# Patient Record
Sex: FEMALE | Race: BLACK OR AFRICAN AMERICAN | NOT HISPANIC OR LATINO | Employment: OTHER | URBAN - METROPOLITAN AREA
[De-identification: names, ages, dates, MRNs, and addresses within clinical notes are randomized per-mention and may not be internally consistent; named-entity substitution may affect disease eponyms.]

---

## 2023-08-29 ENCOUNTER — OFFICE VISIT (OUTPATIENT)
Dept: PRIMARY CARE | Facility: CLINIC | Age: 83
End: 2023-08-29
Payer: COMMERCIAL

## 2023-08-29 VITALS
WEIGHT: 162 LBS | HEIGHT: 67 IN | HEART RATE: 111 BPM | BODY MASS INDEX: 25.43 KG/M2 | SYSTOLIC BLOOD PRESSURE: 139 MMHG | TEMPERATURE: 96 F | DIASTOLIC BLOOD PRESSURE: 70 MMHG

## 2023-08-29 DIAGNOSIS — M25.561 CHRONIC PAIN OF RIGHT KNEE: ICD-10-CM

## 2023-08-29 DIAGNOSIS — G89.29 CHRONIC PAIN OF RIGHT KNEE: ICD-10-CM

## 2023-08-29 DIAGNOSIS — E78.00 HYPERCHOLESTEROLEMIA: ICD-10-CM

## 2023-08-29 DIAGNOSIS — E55.9 VITAMIN D DEFICIENCY: ICD-10-CM

## 2023-08-29 DIAGNOSIS — H40.9 GLAUCOMA, UNSPECIFIED GLAUCOMA TYPE, UNSPECIFIED LATERALITY: ICD-10-CM

## 2023-08-29 DIAGNOSIS — I10 BENIGN ESSENTIAL HTN: Chronic | ICD-10-CM

## 2023-08-29 DIAGNOSIS — Z23 NEED FOR PNEUMOCOCCAL VACCINE: ICD-10-CM

## 2023-08-29 DIAGNOSIS — Z00.00 ANNUAL PHYSICAL EXAM: Primary | ICD-10-CM

## 2023-08-29 PROBLEM — R73.03 PREDIABETES: Status: ACTIVE | Noted: 2018-07-24

## 2023-08-29 PROCEDURE — 1036F TOBACCO NON-USER: CPT | Performed by: INTERNAL MEDICINE

## 2023-08-29 PROCEDURE — G0439 PPPS, SUBSEQ VISIT: HCPCS | Performed by: INTERNAL MEDICINE

## 2023-08-29 PROCEDURE — 3075F SYST BP GE 130 - 139MM HG: CPT | Performed by: INTERNAL MEDICINE

## 2023-08-29 PROCEDURE — 3078F DIAST BP <80 MM HG: CPT | Performed by: INTERNAL MEDICINE

## 2023-08-29 PROCEDURE — 1159F MED LIST DOCD IN RCRD: CPT | Performed by: INTERNAL MEDICINE

## 2023-08-29 PROCEDURE — 1170F FXNL STATUS ASSESSED: CPT | Performed by: INTERNAL MEDICINE

## 2023-08-29 PROCEDURE — 1160F RVW MEDS BY RX/DR IN RCRD: CPT | Performed by: INTERNAL MEDICINE

## 2023-08-29 RX ORDER — DILTIAZEM HYDROCHLORIDE 120 MG/1
120 CAPSULE, EXTENDED RELEASE ORAL DAILY
Qty: 90 CAPSULE | Refills: 3 | Status: SHIPPED | OUTPATIENT
Start: 2023-08-29 | End: 2024-08-28

## 2023-08-29 RX ORDER — DILTIAZEM HYDROCHLORIDE 120 MG/1
120 CAPSULE, EXTENDED RELEASE ORAL DAILY
COMMUNITY
Start: 2023-06-15 | End: 2023-08-29 | Stop reason: SDUPTHER

## 2023-08-29 RX ORDER — EZETIMIBE 10 MG/1
10 TABLET ORAL DAILY
Qty: 90 TABLET | Refills: 3 | Status: SHIPPED | OUTPATIENT
Start: 2023-08-29 | End: 2024-08-28

## 2023-08-29 ASSESSMENT — ENCOUNTER SYMPTOMS
OCCASIONAL FEELINGS OF UNSTEADINESS: 0
FEVER: 0
DEPRESSION: 0
POLYDIPSIA: 0
LOSS OF SENSATION IN FEET: 0
SHORTNESS OF BREATH: 0
CHILLS: 0
PALPITATIONS: 0
COUGH: 0

## 2023-08-29 ASSESSMENT — PATIENT HEALTH QUESTIONNAIRE - PHQ9
SUM OF ALL RESPONSES TO PHQ9 QUESTIONS 1 AND 2: 0
1. LITTLE INTEREST OR PLEASURE IN DOING THINGS: NOT AT ALL
2. FEELING DOWN, DEPRESSED OR HOPELESS: NOT AT ALL

## 2023-08-29 ASSESSMENT — ACTIVITIES OF DAILY LIVING (ADL)
DRESSING: INDEPENDENT
TAKING_MEDICATION: INDEPENDENT
DOING_HOUSEWORK: INDEPENDENT
GROCERY_SHOPPING: INDEPENDENT
MANAGING_FINANCES: INDEPENDENT
BATHING: INDEPENDENT

## 2023-08-29 NOTE — PROGRESS NOTES
"Subjective   Patient ID: Wilma Lazcano is a 83 y.o. female who presents for No chief complaint on file..    Patient presents today for an annual wellness exam    Medical history and surgical history updated today  Medication list reconciled and updated  Patient denies vision issues at this time  Patient denies hearing issues at this time  Follows with a dentist: Yes    Patient counseled about available preventative health vaccinations and provided with opportunity to update them with our office or through prescription to preferred pharmacy.    Reviewed and discussed preventative health screening recommendations for colon cancer: age out    Reviewed and discussed preventative health recommendations for screening for cervical cancer and breast cancer : age out    Eye specialist follow up scheduled for Oct 29th.     Exercise habits are severely limited by the patient's chronic right knee issues.  Pain and aching limited mobility limited range of motion limited ambulation secondary to knee issues.  No prior imaging or assessment on file.  This is reportedly a chronic issue and does not report a acute change.  She been decreasing her exercise capacity over the last 2 years roughly.  Previously was walking on local school track every morning at 6:30 AM but now is reduced to ambulating around the house only.  No significant swelling.  Admits to changes with severity on different days as well as different weather and environments.    PT declined vaccine last minute after order.          Review of Systems   Constitutional:  Negative for chills and fever.   Respiratory:  Negative for cough and shortness of breath.    Cardiovascular:  Negative for chest pain and palpitations.   Endocrine: Negative for polydipsia and polyuria.       Objective   /70   Pulse (!) 111   Temp 35.6 °C (96 °F)   Ht 1.702 m (5' 7\")   Wt 73.5 kg (162 lb)   BMI 25.37 kg/m²     Physical Exam  Constitutional:       Appearance: Normal " appearance.   HENT:      Head: Normocephalic and atraumatic.      Right Ear: Tympanic membrane normal.      Left Ear: Tympanic membrane normal.      Nose: Nose normal.      Mouth/Throat:      Mouth: Mucous membranes are moist.   Eyes:      Extraocular Movements: Extraocular movements intact.      Conjunctiva/sclera: Conjunctivae normal.      Pupils: Pupils are equal, round, and reactive to light.   Neck:      Thyroid: No thyroid mass, thyromegaly or thyroid tenderness.      Vascular: No carotid bruit.   Cardiovascular:      Rate and Rhythm: Normal rate and regular rhythm.      Heart sounds: No murmur heard.     No friction rub. No gallop.   Pulmonary:      Effort: Pulmonary effort is normal. No respiratory distress.      Breath sounds: No wheezing, rhonchi or rales.   Abdominal:      General: Bowel sounds are normal.      Palpations: Abdomen is soft.      Tenderness: There is no abdominal tenderness. There is no guarding.      Hernia: No hernia is present.   Musculoskeletal:      Cervical back: Neck supple. No tenderness.      Right lower leg: No edema.      Left lower leg: No edema.   Lymphadenopathy:      Cervical: No cervical adenopathy.   Skin:     Coloration: Skin is not jaundiced.   Neurological:      General: No focal deficit present.      Mental Status: She is alert and oriented to person, place, and time.   Psychiatric:         Mood and Affect: Mood normal.         Assessment/Plan   Problem List Items Addressed This Visit       Benign essential HTN (Chronic)    Relevant Medications    dilTIAZem XR (Dilacor XR) 120 mg 24 hr capsule    Other Relevant Orders    Lipid Panel    Vitamin D, Total    CBC    Comprehensive Metabolic Panel    Hemoglobin A1C    Hypercholesterolemia    Relevant Medications    ezetimibe (Zetia) 10 mg tablet    Other Relevant Orders    Lipid Panel    Vitamin D, Total    CBC    Comprehensive Metabolic Panel    Hemoglobin A1C    Vitamin D deficiency    Relevant Orders    Lipid Panel     Vitamin D, Total    CBC    Comprehensive Metabolic Panel    Hemoglobin A1C     Other Visit Diagnoses       Annual physical exam    -  Primary    Relevant Orders    Lipid Panel    Vitamin D, Total    CBC    Comprehensive Metabolic Panel    Hemoglobin A1C    Glaucoma, unspecified glaucoma type, unspecified laterality        Chronic pain of right knee        Relevant Orders    XR knee right 3 views    Need for pneumococcal vaccine        Relevant Orders    Pneumococcal conjugate vaccine, 20-valent, adult (PREVNAR 20)

## 2023-08-29 NOTE — PATIENT INSTRUCTIONS
Please consider the following information on healthy lifestyle choices:  We encourage a diet that is low in refined sugar as well as saturated fats.  Saturated fats are commonly found in fried foods, high fat dairy products like milk creams cheeses and butters, as well as red meat.    The goal for healthy exercise would be to target 100 minutes or more per week of exercise for the sake of exercise.  This can be a mixture of cardiovascular exercise in the form of walking running jogging swimming etc., or strength training exercise.  There are independent benefits of cardiovascular based exercise and reducing the chances of heart disease in a lifetime.    Healthy weight is always a benefit but individuals will have different goals and healthy weight targets.  We want you to be the best version of yourself.  It is important to find a balance between your calories in and your calories out through exercise and lifestyle.  This is difficult and there is no one universal truth here.  The best place to start is oftentimes with figuring out the reality of the calories you eat with a calorie counting renetta or assistant tool and finding ways to moderate or control calories in combination with healthy lifestyle choices like exercise.    Staying on top of vaccinations is an important step in long-term preventative health and preventing unnecessary illness.  Please consider any recommendations we discussed in our appointment, Please consider the annual flu shot, the new vaccine for RSV, and the most recent booster for COVID 19 vaccine.     Please consider Zetia (Ezetimibe) for your cholesterol as we discussed.

## 2023-09-18 ENCOUNTER — LAB (OUTPATIENT)
Dept: LAB | Facility: LAB | Age: 83
End: 2023-09-18
Payer: COMMERCIAL

## 2023-09-18 DIAGNOSIS — I10 BENIGN ESSENTIAL HTN: Chronic | ICD-10-CM

## 2023-09-18 DIAGNOSIS — E55.9 VITAMIN D DEFICIENCY: ICD-10-CM

## 2023-09-18 DIAGNOSIS — E78.00 HYPERCHOLESTEROLEMIA: ICD-10-CM

## 2023-09-18 DIAGNOSIS — Z00.00 ANNUAL PHYSICAL EXAM: ICD-10-CM

## 2023-09-18 LAB
ALANINE AMINOTRANSFERASE (SGPT) (U/L) IN SER/PLAS: 13 U/L (ref 7–45)
ALBUMIN (G/DL) IN SER/PLAS: 4.2 G/DL (ref 3.4–5)
ALKALINE PHOSPHATASE (U/L) IN SER/PLAS: 91 U/L (ref 33–136)
ANION GAP IN SER/PLAS: 13 MMOL/L (ref 10–20)
ASPARTATE AMINOTRANSFERASE (SGOT) (U/L) IN SER/PLAS: 16 U/L (ref 9–39)
BILIRUBIN TOTAL (MG/DL) IN SER/PLAS: 0.6 MG/DL (ref 0–1.2)
CALCIDIOL (25 OH VITAMIN D3) (NG/ML) IN SER/PLAS: 31 NG/ML
CALCIUM (MG/DL) IN SER/PLAS: 9.9 MG/DL (ref 8.6–10.6)
CARBON DIOXIDE, TOTAL (MMOL/L) IN SER/PLAS: 28 MMOL/L (ref 21–32)
CHLORIDE (MMOL/L) IN SER/PLAS: 107 MMOL/L (ref 98–107)
CHOLESTEROL (MG/DL) IN SER/PLAS: 205 MG/DL (ref 0–199)
CHOLESTEROL IN HDL (MG/DL) IN SER/PLAS: 53.7 MG/DL
CHOLESTEROL/HDL RATIO: 3.8
CREATININE (MG/DL) IN SER/PLAS: 1.04 MG/DL (ref 0.5–1.05)
ERYTHROCYTE DISTRIBUTION WIDTH (RATIO) BY AUTOMATED COUNT: 15.4 % (ref 11.5–14.5)
ERYTHROCYTE MEAN CORPUSCULAR HEMOGLOBIN CONCENTRATION (G/DL) BY AUTOMATED: 30.3 G/DL (ref 32–36)
ERYTHROCYTE MEAN CORPUSCULAR VOLUME (FL) BY AUTOMATED COUNT: 86 FL (ref 80–100)
ERYTHROCYTES (10*6/UL) IN BLOOD BY AUTOMATED COUNT: 5.33 X10E12/L (ref 4–5.2)
ESTIMATED AVERAGE GLUCOSE FOR HBA1C: 131 MG/DL
GFR FEMALE: 53 ML/MIN/1.73M2
GLUCOSE (MG/DL) IN SER/PLAS: 101 MG/DL (ref 74–99)
HEMATOCRIT (%) IN BLOOD BY AUTOMATED COUNT: 45.9 % (ref 36–46)
HEMOGLOBIN (G/DL) IN BLOOD: 13.9 G/DL (ref 12–16)
HEMOGLOBIN A1C/HEMOGLOBIN TOTAL IN BLOOD: 6.2 %
LDL: 135 MG/DL (ref 0–99)
LEUKOCYTES (10*3/UL) IN BLOOD BY AUTOMATED COUNT: 5.2 X10E9/L (ref 4.4–11.3)
NRBC (PER 100 WBCS) BY AUTOMATED COUNT: 0 /100 WBC (ref 0–0)
PLATELETS (10*3/UL) IN BLOOD AUTOMATED COUNT: 310 X10E9/L (ref 150–450)
POTASSIUM (MMOL/L) IN SER/PLAS: 4.3 MMOL/L (ref 3.5–5.3)
PROTEIN TOTAL: 7.3 G/DL (ref 6.4–8.2)
SODIUM (MMOL/L) IN SER/PLAS: 144 MMOL/L (ref 136–145)
TRIGLYCERIDE (MG/DL) IN SER/PLAS: 83 MG/DL (ref 0–149)
UREA NITROGEN (MG/DL) IN SER/PLAS: 13 MG/DL (ref 6–23)
VLDL: 17 MG/DL (ref 0–40)

## 2023-09-18 PROCEDURE — 85027 COMPLETE CBC AUTOMATED: CPT

## 2023-09-18 PROCEDURE — 80061 LIPID PANEL: CPT

## 2023-09-18 PROCEDURE — 80053 COMPREHEN METABOLIC PANEL: CPT

## 2023-09-18 PROCEDURE — 82306 VITAMIN D 25 HYDROXY: CPT

## 2023-09-18 PROCEDURE — 36415 COLL VENOUS BLD VENIPUNCTURE: CPT

## 2023-09-18 PROCEDURE — 83036 HEMOGLOBIN GLYCOSYLATED A1C: CPT

## 2023-09-18 NOTE — LETTER
September 23, 2023     Wilma Lazcano  [address] [address] [facility] [facility] [place] [address]      Dear Ms. Lazcano:    There are some seriously improved readings here and the patient blood work.  Blood counts are normal cholesterol is improved by 80 points almost with the LDL bad cholesterol going from 270 to135.  Kidney liver electrolyte panel is all perfectly normal.  Vitamin D levels are normal.  What we do see is that the average blood sugar is in the prediabetic range at 6.2 and average sugar of 130.  We will need to keep an eye on this and I would also advise the patient consider evaluating her diet for excess sugar including simple sugars from things like junk food but also carbohydrates which include potatoes Pasta breads and grains.  A goal of reducing in this category by 25% over the next few months I think would be very beneficial.     Below are the results from your recent visit:    Resulted Orders   Lipid Panel   Result Value Ref Range    Cholesterol 205 (H) 0 - 199 mg/dL      Comment:      .      AGE      DESIRABLE   BORDERLINE HIGH   HIGH     0-19 Y     0 - 169       170 - 199     >/= 200    20-24 Y     0 - 189       190 - 224     >/= 225         >24 Y     0 - 199       200 - 239     >/= 240   **All ranges are based on fasting samples. Specific   therapeutic targets will vary based on patient-specific   cardiac risk.  .   Pediatric guidelines reference:Pediatrics 2011, 128(S5).   Adult guidelines reference: NCEP ATPIII Guidelines,     EFRAÍN 2001, 258:2486-97  .   Venipuncture immediately after or during the    administration of Metamizole may lead to falsely   low results. Testing should be performed immediately   prior to Metamizole dosing.    HDL 53.7 mg/dL      Comment:      .      AGE      VERY LOW   LOW     NORMAL    HIGH       0-19 Y       < 35   < 40     40-45     ----    20-24 Y       ----   < 40       >45     ----      >24 Y       ----   < 40     40-60      >60  .    Cholesterol/HDL Ratio 3.8        Comment:      REF VALUES  DESIRABLE  < 3.4  HIGH RISK  > 5.0     (H) 0 - 99 mg/dL      Comment:      .                           NEAR      BORD      AGE      DESIRABLE  OPTIMAL    HIGH     HIGH     VERY HIGH     0-19 Y     0 - 109     ---    110-129   >/= 130     ----    20-24 Y     0 - 119     ---    120-159   >/= 160     ----      >24 Y     0 -  99   100-129  130-159   160-189     >/=190  .    VLDL 17 0 - 40 mg/dL    Triglycerides 83 0 - 149 mg/dL      Comment:      .      AGE      DESIRABLE   BORDERLINE HIGH   HIGH     VERY HIGH   0 D-90 D    19 - 174         ----         ----        ----  91 D- 9 Y     0 -  74        75 -  99     >/= 100      ----    10-19 Y     0 -  89        90 - 129     >/= 130      ----    20-24 Y     0 - 114       115 - 149     >/= 150      ----         >24 Y     0 - 149       150 - 199    200- 499    >/= 500  .   Venipuncture immediately after or during the    administration of Metamizole may lead to falsely   low results. Testing should be performed immediately   prior to Metamizole dosing.   Vitamin D, Total   Result Value Ref Range    Vitamin D, 25-Hydroxy 31 ng/mL      Comment:      .  DEFICIENCY:         < 20   NG/ML  INSUFFICIENCY:      20-29  NG/ML  SUFFICIENCY:         NG/ML    THIS ASSAY ACCURATELY QUANTIFIES THE SUM OF  VITAMIN D3, 25-HYDROXY AND VIT D2,25-HYDROXY.   CBC   Result Value Ref Range    WBC 5.2 4.4 - 11.3 x10E9/L    nRBC 0.0 0.0 - 0.0 /100 WBC    RBC 5.33 (H) 4.00 - 5.20 x10E12/L    Hemoglobin 13.9 12.0 - 16.0 g/dL    Hematocrit 45.9 36.0 - 46.0 %    MCV 86 80 - 100 fL    MCHC 30.3 (L) 32.0 - 36.0 g/dL    Platelets 310 150 - 450 x10E9/L    RDW 15.4 (H) 11.5 - 14.5 %   Comprehensive Metabolic Panel   Result Value Ref Range    Glucose 101 (H) 74 - 99 mg/dL    Sodium 144 136 - 145 mmol/L    Potassium 4.3 3.5 - 5.3 mmol/L    Chloride 107 98 - 107 mmol/L    Bicarbonate 28 21 - 32 mmol/L    Anion Gap 13 10 - 20 mmol/L    Urea Nitrogen 13 6 - 23 mg/dL     Creatinine 1.04 0.50 - 1.05 mg/dL    GFR Female 53 (A) >90 mL/min/1.73m2      Comment:       CALCULATIONS OF ESTIMATED GFR ARE PERFORMED   USING THE 2021 CKD-EPI STUDY REFIT EQUATION   WITHOUT THE RACE VARIABLE FOR THE IDMS-TRACEABLE   CREATININE METHODS.    https://jasn.asnjournals.org/content/early/2021/09/22/ASN.8205008193    Calcium 9.9 8.6 - 10.6 mg/dL    Albumin 4.2 3.4 - 5.0 g/dL    Alkaline Phosphatase 91 33 - 136 U/L    Total Protein 7.3 6.4 - 8.2 g/dL    AST 16 9 - 39 U/L    Total Bilirubin 0.6 0.0 - 1.2 mg/dL    ALT (SGPT) 13 7 - 45 U/L      Comment:       Patients treated with Sulfasalazine may generate    falsely decreased results for ALT.   Hemoglobin A1C   Result Value Ref Range    Hemoglobin A1C 6.2 (A) %      Comment:           Diagnosis of Diabetes-Adults   Non-Diabetic: < or = 5.6%   Increased risk for developing diabetes: 5.7-6.4%   Diagnostic of diabetes: > or = 6.5%  .       Monitoring of Diabetes                Age (y)     Therapeutic Goal (%)   Adults:          >18           <7.0   Pediatrics:    13-18           <7.5                   7-12           <8.0                   0- 6            7.5-8.5   American Diabetes Association. Diabetes Care 33(S1), Jan 2010.    Estimated Average Glucose 131 MG/DL       If you have any questions or concerns, please don't hesitate to call.         Sincerely,        Dr. Shawn Ortiz

## 2023-09-19 NOTE — RESULT ENCOUNTER NOTE
There are some seriously improved readings here and the patient blood work.  Blood counts are normal cholesterol is improved by 80 points almost with the LDL bad cholesterol going from 270 to135.  Kidney liver electrolyte panel is all perfectly normal.  Vitamin D levels are normal.  What we do see is that the patient's average blood sugar is in the prediabetic range at 6.2 and average sugar of 130.  We will need to keep an eye on this and I would also advise the patient consider evaluating her diet for excess sugar including simple sugars from things like junk food but also carbohydrates which include potatoes Pasta breads and grains.  A goal of reducing in this category by 25% over the next few months I think would be very beneficial.

## 2023-09-20 NOTE — RESULT ENCOUNTER NOTE
Moderate arthritis seen throughout the entire knee it does seem to be a little worse on the lateral outside aspect compared to the other sides.  These findings of arthritis would be consistent with the patient's symptoms and experience we discussed at her appointment

## 2023-10-09 ENCOUNTER — TELEPHONE (OUTPATIENT)
Dept: PRIMARY CARE | Facility: CLINIC | Age: 83
End: 2023-10-09
Payer: COMMERCIAL

## 2023-10-09 NOTE — TELEPHONE ENCOUNTER
Patient's daughter wanted to know if its possible to get knee injection based of her xray she had recently. Patient has pain in knee.

## 2023-10-18 DIAGNOSIS — M17.10 PRIMARY OSTEOARTHRITIS OF KNEE, UNSPECIFIED LATERALITY: Primary | ICD-10-CM

## 2023-10-25 ENCOUNTER — TELEPHONE (OUTPATIENT)
Dept: ORTHOPEDIC SURGERY | Facility: CLINIC | Age: 83
End: 2023-10-25
Payer: COMMERCIAL

## 2023-10-25 PROBLEM — R00.0 TACHYCARDIA, UNSPECIFIED: Status: ACTIVE | Noted: 2023-10-25

## 2023-10-25 NOTE — TELEPHONE ENCOUNTER
Called pt/daughter about 10/31 appt for Dr Garcia.    Daughter Reyna confirmed pt is looking for [facility] provider for knee injections ( per PCP notes).     I advised that Dr Garcia id foot/ankle specialist and does not do knee injections. I provider Reyna with contact info for Dr Lisa Bee, [facility], to scheduled for knee injections.     Agreed to cancel Dr Garcia appt. No other concerns at this time.

## 2023-10-31 ENCOUNTER — APPOINTMENT (OUTPATIENT)
Dept: ORTHOPEDIC SURGERY | Facility: HOSPITAL | Age: 83
End: 2023-10-31
Payer: COMMERCIAL

## 2023-11-01 ENCOUNTER — OFFICE VISIT (OUTPATIENT)
Dept: ORTHOPEDIC SURGERY | Facility: HOSPITAL | Age: 83
End: 2023-11-01
Payer: COMMERCIAL

## 2023-11-01 VITALS — BODY MASS INDEX: 26.52 KG/M2 | WEIGHT: 165 LBS | HEIGHT: 66 IN

## 2023-11-01 DIAGNOSIS — M17.11 ARTHRITIS OF RIGHT KNEE: Primary | ICD-10-CM

## 2023-11-01 PROCEDURE — 99214 OFFICE O/P EST MOD 30 MIN: CPT | Performed by: STUDENT IN AN ORGANIZED HEALTH CARE EDUCATION/TRAINING PROGRAM

## 2023-11-01 PROCEDURE — 1036F TOBACCO NON-USER: CPT | Performed by: STUDENT IN AN ORGANIZED HEALTH CARE EDUCATION/TRAINING PROGRAM

## 2023-11-01 PROCEDURE — 1125F AMNT PAIN NOTED PAIN PRSNT: CPT | Performed by: STUDENT IN AN ORGANIZED HEALTH CARE EDUCATION/TRAINING PROGRAM

## 2023-11-01 PROCEDURE — 3075F SYST BP GE 130 - 139MM HG: CPT | Performed by: STUDENT IN AN ORGANIZED HEALTH CARE EDUCATION/TRAINING PROGRAM

## 2023-11-01 PROCEDURE — 1160F RVW MEDS BY RX/DR IN RCRD: CPT | Performed by: STUDENT IN AN ORGANIZED HEALTH CARE EDUCATION/TRAINING PROGRAM

## 2023-11-01 PROCEDURE — 1159F MED LIST DOCD IN RCRD: CPT | Performed by: STUDENT IN AN ORGANIZED HEALTH CARE EDUCATION/TRAINING PROGRAM

## 2023-11-01 PROCEDURE — 3078F DIAST BP <80 MM HG: CPT | Performed by: STUDENT IN AN ORGANIZED HEALTH CARE EDUCATION/TRAINING PROGRAM

## 2023-11-01 PROCEDURE — 99204 OFFICE O/P NEW MOD 45 MIN: CPT | Performed by: STUDENT IN AN ORGANIZED HEALTH CARE EDUCATION/TRAINING PROGRAM

## 2023-11-01 ASSESSMENT — PAIN DESCRIPTION - DESCRIPTORS: DESCRIPTORS: ACHING

## 2023-11-01 ASSESSMENT — PAIN SCALES - GENERAL: PAINLEVEL_OUTOF10: 1

## 2023-11-01 ASSESSMENT — PAIN - FUNCTIONAL ASSESSMENT: PAIN_FUNCTIONAL_ASSESSMENT: 0-10

## 2023-11-01 NOTE — PROGRESS NOTES
Lisa Bee MD    Adult Reconstruction and Joint Replacement Surgery   Phone: [phone]     Fax: [phone]        INITIAL CONSULTATION      Name: Wilma Lazcano   : 1940   Date of Visit:  2023      CC: Right knee pain      Clinical History:   Wilma Lazcano is a 83 y.o. female who presents with several years of RIGHT knee pain. She presents today with her daughter.     Patient has tried the following Ice, NSAIDs, Activity modification, Physical therapy, Corticosteroid injections , and Xray. Patient does not have pain at night. Patient is able to walk unlimited blocks. Patient is currently using nothing as assistive device. Primarily complains of lateral  pain. Patient has difficulty with climbing stairs, descending stairs, walking , and walking on unlevel surfaces . The pain is significantly impacting her ability to perform activities of daily living.  He has seen urology this is his second     Date of last steroid injection: >3 months     Prior hip/knee replacement: No      PROMs    No questionnaires on file.      Past Medical History:   Diagnosis Date    Atopic dermatitis 2005    Formatting of this note might be different from the original. Overview: OTHER ATOPIC DERMATITIS 9/15/09 -  This diagnosis record replaces an equivalently named record which has been inactivated via use of a system maintenance tool. Formatting of this note might be different from the original. OTHER ATOPIC DERMATITIS 9/15/09 -  This diagnosis record replaces an equivalently named record which has b    Personal history of other specified conditions 10/23/2020    History of vertigo    Personal history of other specified conditions 10/23/2020    History of palpitations      Past Surgical History:   Procedure Laterality Date    OTHER SURGICAL HISTORY  2019    Hysterectomy         Allergies: She is allergic to aspirin, atorvastatin, cephalexin, ciprofloxacin, erythromycin, latanoprost,  lovastatin, other, penicillins, simvastatin, sulfa (sulfonamide antibiotics), tetanus vaccines and toxoid, and tetracyclines.       Medications:   Current Outpatient Medications   Medication Instructions    dilTIAZem XR (DILACOR XR) 120 mg, oral, Daily    ezetimibe (ZETIA) 10 mg, oral, Daily         Family History   Problem Relation Name Age of Onset    Diabetes Mother      Hypertension Mother      Hypertension Father      Diabetes Sister      Hypertension Sister      Hypertension Brother      Colon cancer Neg Hx      Breast cancer Neg Hx        Documented in chart and reviewed.      Social History     Tobacco Use    Smoking status: Never    Smokeless tobacco: Never   Substance Use Topics    Alcohol use: Never         Review of Systems: Review of systems completed with medical assistant intake. Please refer to this note.       Falls: The patient denies any recent falls or fall-related injuries.      Physical Exam:   BMI: Patient's Body mass index is 26.63 kg/m²., which is abnormal. Encouraged to lose weight and to follow up with PCP.      Constitutional: The patient is well appearing and well groomed.       Neurological/Psychiatric: The patient is alert and oriented to person, place and time. The patient has a normal mood and affect.      Skin Examination: The skin over the right lower extremity, left lower extremity, right upper extremity, and left upper extremity is intact without any evidence of infection or rash.      Cardiovascular Examination: There are no varicosities and the skin is normal temperature, capillary refill normal, arterial pulses normal, no edema.      Lymphatic Examination: There is no lymphatic swelling or palpable lymph nodes present.      Neurological Examination: Bilateral lower extremities are grossly neurologically intact. Sensation normal, motor function normal, coordination / cerebellum normal, reflexes normal      Gait: The patient ambulates with an antalgic gait.       Right Hip  Examination:   The skin is intact over the hip.      There is no tenderness over the greater trochanter.      Range of motion is full extension to 100 degrees of flexion.      The hip is stable without subluxation or dislocation.      The hip internally rotates to 15 degrees and externally rotates to 45 degrees.      There is no pain with hip motion.      Left Hip Examination:   The skin is intact over the hip.      There is no tenderness over the greater trochanter.      Range of motion is full extension to 100 degrees of flexion.      The hip is stable without subluxation or dislocation.      The hip internally rotates to 15 degrees and externally rotates to 45 degrees.      There is no pain with hip motion.      Right Knee Examination:   Examination of the knee reveals the skin to be intact.      There is a small effusion in the knee.      The alignment of the knee is Valgus.      This deformity is not correctable.      There is tenderness to palpation over the joint line.      There is significant quadriceps atrophy.      Range of Motion: 5 to 110 degrees of flexion.      The knee is stable to varus-valgus stress and anterior-posterior stress.       There is moderate grinding with range of motion.      There is moderate patellofemoral crepitus.      Left Knee Examination:   Examination of the knee reveals the skin to be intact.       There is no obvious swelling.      There is a no effusion in the knee.       The alignment of the knee is normal.      There is no tenderness to palpation over the joint line.      There is no significant quadriceps atrophy.      Range of motion is 10 extension to 110 degrees of flexion.      The knee is stable to varus-valgus stress and anterior-posterior stress.       There is mild grinding with range of motion.      There is mild patellofemoral crepitus.      Radiographs:   Radiographs were personally reviewed today. There is evidence of severe RIGHT  knee osteoarthritis with  LATERAL  bone on bone apposition.      Impression:   83 y.o. female presents with severe RIGHT  knee osteoarthritis with bone on bone apposition. Patient has tried and failed appropriate conservative measures and now has limitation in ADL's.       Diagnosis:   No diagnosis found.      Recommendations / Plan:      I have discussed the options in detail with the patient. We have discussed anti-inflammatory medication, activity modification, physical therapy, corticosteroid injections, viscosupplementation injections, and total knee replacement surgery.     Patient is not interested in surgical invention.  It has been sometime since she last had a steroid injection.  She requests a steroid injection to her right knee today.  Discussed that these can be done as frequently as every 3 months but ideally she waits until the effect wanes.  Reviewed oral medications that she could try to help her pain, though she prefers to limit any medication intake.  She declines a brace.  Not interested in physical therapy at this time as she stays quite active with her own home exercise program.  The patient verbalized satisfaction with this plan. She may follow-up with me as needed.    Large Joint Injection 75858: Knee   Consent given by:?Patient   Timeout:?Immediately prior to procedure the correct patient, procedure, and site was verified.?Sterile gloves and semi-sterile technique were used.    Indications:?Knee pain and joint swelling.?   Location:?RIGHT knee   Needle size:?22 G   Approach:?Lateral      Medications administered:? please see medical assistant note for Lot number and expiration  Subcutaneous    4 ml of 1% lidocaine      Deep    4 ml of 1% lidocaine   4 mL 0.5% marcaine   2 mL of 40 mg kenalog       Patient tolerance:?Dressing applied. Patient tolerated the procedure well with no immediate complications.     _____________   Lisa Bee MD    [facility]        Approximately?45?minutes were spent on the following tasks:   ????????????Preparing for the patient   ????????????Reviewing medical records   ????????????Taking a patient history   ????????????Performing a physical exam   ????????????Reviewing treatment options with the patient   ????????????Explaining the risks, potential benefits, and alternative to surgery   Explaining the expected rehabilitation after each treatment option   Explaining the potential long term expectations   Evaluating the diagnostic imaging      This office note was transcribed with dictation software. ?Please excuse any typographical errors, program misunderstandings leading to inadvertent insertions or deletions of inappropriate wording, pronoun errors and other unintentional transcription errors not noticed on proof-reading.

## 2024-01-30 ENCOUNTER — APPOINTMENT (OUTPATIENT)
Dept: OPHTHALMOLOGY | Facility: CLINIC | Age: 84
End: 2024-01-30
Payer: COMMERCIAL

## 2024-07-18 ENCOUNTER — APPOINTMENT (OUTPATIENT)
Dept: OPHTHALMOLOGY | Facility: CLINIC | Age: 84
End: 2024-07-18
Payer: COMMERCIAL

## 2024-07-24 ENCOUNTER — OFFICE VISIT (OUTPATIENT)
Dept: ORTHOPEDIC SURGERY | Facility: HOSPITAL | Age: 84
End: 2024-07-24
Payer: COMMERCIAL

## 2024-07-24 DIAGNOSIS — M17.11 ARTHRITIS OF RIGHT KNEE: Primary | ICD-10-CM

## 2024-07-24 PROCEDURE — 2500000005 HC RX 250 GENERAL PHARMACY W/O HCPCS: Performed by: STUDENT IN AN ORGANIZED HEALTH CARE EDUCATION/TRAINING PROGRAM

## 2024-07-24 PROCEDURE — 1159F MED LIST DOCD IN RCRD: CPT | Performed by: STUDENT IN AN ORGANIZED HEALTH CARE EDUCATION/TRAINING PROGRAM

## 2024-07-24 PROCEDURE — 2500000004 HC RX 250 GENERAL PHARMACY W/ HCPCS (ALT 636 FOR OP/ED): Performed by: STUDENT IN AN ORGANIZED HEALTH CARE EDUCATION/TRAINING PROGRAM

## 2024-07-24 PROCEDURE — 99213 OFFICE O/P EST LOW 20 MIN: CPT | Performed by: STUDENT IN AN ORGANIZED HEALTH CARE EDUCATION/TRAINING PROGRAM

## 2024-07-24 PROCEDURE — 20610 DRAIN/INJ JOINT/BURSA W/O US: CPT | Mod: RT | Performed by: STUDENT IN AN ORGANIZED HEALTH CARE EDUCATION/TRAINING PROGRAM

## 2024-07-24 RX ORDER — LIDOCAINE HYDROCHLORIDE 10 MG/ML
8 INJECTION INFILTRATION; PERINEURAL
Status: COMPLETED | OUTPATIENT
Start: 2024-07-24 | End: 2024-07-24

## 2024-07-24 RX ORDER — BUPIVACAINE HYDROCHLORIDE 5 MG/ML
4 INJECTION, SOLUTION PERINEURAL
Status: COMPLETED | OUTPATIENT
Start: 2024-07-24 | End: 2024-07-24

## 2024-07-24 RX ORDER — TRIAMCINOLONE ACETONIDE 40 MG/ML
80 INJECTION, SUSPENSION INTRA-ARTICULAR; INTRAMUSCULAR
Status: COMPLETED | OUTPATIENT
Start: 2024-07-24 | End: 2024-07-24

## 2024-07-24 ASSESSMENT — PAIN - FUNCTIONAL ASSESSMENT: PAIN_FUNCTIONAL_ASSESSMENT: 0-10

## 2024-07-24 ASSESSMENT — PAIN DESCRIPTION - DESCRIPTORS: DESCRIPTORS: ACHING;THROBBING

## 2024-07-24 ASSESSMENT — PAIN SCALES - GENERAL: PAINLEVEL_OUTOF10: 5 - MODERATE PAIN

## 2024-07-24 NOTE — PROGRESS NOTES
Lisa Bee MD   Adult Reconstruction and Joint Replacement Surgery  Phone: [phone]     Fax: [phone]       Name: Wilma Lazcano  : 1940 (Age: 84 y.o.)  Date of Visit: 2024    Follow-up Knee    CC: Follow-up RIGHT knee     History of Present Illness:  This patient with several months of RIGHT knee pain.     The patient presents for follow-up evaluation of RIGHT knee. They were last seen for this problem on 2023.  At that visit, she underwent steroid injection to the right knee.  She reports very good relief after the injection, but the pain has now returned.    Patient has tried the following Ice, NSAIDs, Excedrin, Activity modification, Physical therapy, Corticosteroid injections , and Xray. Patient does not have pain at night. Patient is able to walk unlimited blocks. Patient is currently using nothing as assistive device. Primarily complains of lateral  pain. Patient has difficulty with climbing stairs, descending stairs, walking , and walking on unlevel surfaces . The pain is significantly impacting her ability to perform activities of daily living.  He has seen urology this is his second    HISTORY  PROMs   No questionnaires on file.     Past Medical History:   Diagnosis Date    Atopic dermatitis 2005    Formatting of this note might be different from the original. Overview: OTHER ATOPIC DERMATITIS 9/15/09 -  This diagnosis record replaces an equivalently named record which has been inactivated via use of a system maintenance tool. Formatting of this note might be different from the original. OTHER ATOPIC DERMATITIS 9/15/09 -  This diagnosis record replaces an equivalently named record which has b    Personal history of other specified conditions 10/23/2020    History of vertigo    Personal history of other specified conditions 10/23/2020    History of palpitations       Past Medical History:   Diagnosis Date    Atopic dermatitis 2005    Formatting of this  note might be different from the original. Overview: OTHER ATOPIC DERMATITIS 9/15/09 -  This diagnosis record replaces an equivalently named record which has been inactivated via use of a system maintenance tool. Formatting of this note might be different from the original. OTHER ATOPIC DERMATITIS 9/15/09 -  This diagnosis record replaces an equivalently named record which has b    Personal history of other specified conditions 10/23/2020    History of vertigo    Personal history of other specified conditions 10/23/2020    History of palpitations     Documented in chart and reviewed.     Past Surgical History:   Procedure Laterality Date    OTHER SURGICAL HISTORY  08/08/2019    Hysterectomy       Allergies: She is allergic to aspirin, atorvastatin, cephalexin, ciprofloxacin, erythromycin, latanoprost, lovastatin, other, penicillins, simvastatin, sulfa (sulfonamide antibiotics), tetanus vaccines and toxoid, and tetracyclines.     Medications:  Current Outpatient Medications   Medication Instructions    dilTIAZem XR (DILACOR XR) 120 mg, oral, Daily    ezetimibe (ZETIA) 10 mg, oral, Daily       Family History   Problem Relation Name Age of Onset    Diabetes Mother      Hypertension Mother      Hypertension Father      Diabetes Sister      Hypertension Sister      Hypertension Brother      Colon cancer Neg Hx      Breast cancer Neg Hx       Documented in chart and reviewed.     Social History     Tobacco Use    Smoking status: Never    Smokeless tobacco: Never   Substance Use Topics    Alcohol use: Never        Review of Systems: Review of systems completed with medical assistant intake. Please refer to this note.     Physical Exam:  BMI: not assessed     General: The patient is well-appearing, alert and oriented to time, place and person and has an appropriate affect.     Neurological Examination: Sensation normal, motor function normal, coordination / cerebellum normal, reflexes normal.    Cardiovascular Exam:  Capillary refill normal, arterial pulses normal, no varicose veins, no edema.    Skin Exam: Skin throughout the upper and lower extremities is normal without any evidence of infection or rash.    Gait: patient ambulates with an antalgic gait.    Right Hip Examination:  The skin is intact over the hip.    There is no tenderness over the greater trochanter.    Range of motion is full extension to 100 degrees of flexion.    The hip is stable without subluxation or dislocation.    The hip internally rotates to 15 degrees and externally rotates to 45 degrees.    There is no pain with hip motion.    Left Hip Examination:  The skin is intact over the hip.    There is no tenderness over the greater trochanter.    Range of motion is full extension to 100 degrees of flexion.    The hip is stable without subluxation or dislocation.    The hip internally rotates to 15 degrees and externally rotates to 45 degrees.    There is no pain with hip motion.    Left Knee Examination:  Examination of the left knee reveals the skin to be intact.    There is a moderate effusion in the knee.    The alignment of the knee is Valgus.    This deformity is not correctable.    There is tenderness to palpation over the joint line.    There is significant quadriceps atrophy.    Range of Motion: 15 to 115 degrees of flexion.    The knee is stable to varus-valgus stress and anterior-posterior stress.     There is moderate grinding with range of motion.    There is moderate patellofemoral crepitus.    Right Knee Examination:  Examination of the left knee reveals the skin to be intact.    There is a moderate effusion in the knee.    The alignment of the knee is Valgus.    This deformity is not correctable.    There is tenderness to palpation over the joint line.    There is significant quadriceps atrophy.    Range of Motion: 15 to 110 degrees of flexion.    The knee is stable to varus-valgus stress and anterior-posterior stress.     There is moderate  grinding with range of motion.    There is moderate patellofemoral crepitus.    Imaging:  Radiographs were personally reviewed today. There is evidence of severe RIGHT knee osteoarthritis with LATERAL  bone on bone apposition.    Impression and Plan:  84 y.o. female  here for follow-up evaluation of RIGHT knee.    DIAGNOSIS  Arthritis of right knee     I have discussed the options in detail with the patient. We have discussed anti-inflammatory medication, activity modification, physical therapy, corticosteroid injections, viscosupplementation injections, partial knee replacement surgery and total knee replacement surgery.  The patient has not yet exhausted nonsurgical treatment options    The risks and benefits of all these treatment options have been discussed in detail.     The patient has tried at least 3 months of the above conservative treatments and continues to have disabling pain, impaired activities of daily living and worsened quality of life.  Reviewed the surgical optimization steps to optimize their chances for a successful joint replacement surgery.      Encouraged the patient to continue physical therapy.  Patient will continue their home exercise program. Strategies for pain management using over-the-counter anti-inflammatory medications reviewed.  She prefers to continue taking Excedrin as this works.  The patient elects for a steroid injection, which was provided according to procedure note below. Encouraged them to maintain range of motion and strength around the knee joints.  They will continue to implement these strategies in addressing their pain.       Recommend the patient continue optimizing nonsurgical treatment interventions as outlined above for management of their knee arthritis.  I would be happy to see them again at any point to discuss surgery if they are more optimized or to review progress with nonsurgical treatment of arthritis. The patient verbalizes understanding with the  recommendations and treatment plan as outlined above and is in agreement.  Questions were addressed.    RTC: As needed    X-rays at next visit: As needed    Large Joint Injection 51490: Knee  Consent given by: Patient  Timeout: Immediately prior to procedure the correct patient, procedure, and site was verified. Sterile gloves and semi-sterile technique were used.   Indications: Knee pain and joint swelling.   Location: RIGHT knee  Needle size: 22 G  Approach: Lateral    Medications administered: Please refer to medical assistant note for lot number and expiration date.   Subcutaneous   4 ml of 1% lidocaine     Deep   4 ml of 1% lidocaine   4 mL 0.5% marcaine   2 mL of 40 mg kenalog     Patient tolerance: Dressing applied. Patient tolerated the procedure well with no immediate complications.    L Inj/Asp: R knee on 7/24/2024 1:16 PM  Indications: pain and joint swelling  Details: 22 G needle, lateral approach  Medications: 80 mg triamcinolone acetonide 40 mg/mL; 8 mL lidocaine 10 mg/mL (1 %); 4 mL BUPivacaine HCl 0.5 % (5 mg/mL)  Outcome: tolerated well, no immediate complications  Procedure, treatment alternatives, risks and benefits explained, specific risks discussed. Consent was given by the patient. Immediately prior to procedure a time out was called to verify the correct patient, procedure, equipment, support staff and site/side marked as required. Patient was prepped and draped in the usual sterile fashion.         _____________________  Lisa Bee MD   Attending Orthopaedic Surgeon  [facility]    [facility]    This office note was transcribed with dictation software.  Please excuse any typographical errors, program misunderstandings leading to inadvertent insertions or deletions of inappropriate wording, pronoun errors and other unintentional transcription errors not noticed on proof-reading.

## 2024-08-16 ENCOUNTER — APPOINTMENT (OUTPATIENT)
Dept: PRIMARY CARE | Facility: CLINIC | Age: 84
End: 2024-08-16
Payer: COMMERCIAL

## 2024-08-23 DIAGNOSIS — I10 BENIGN ESSENTIAL HTN: Chronic | ICD-10-CM

## 2024-08-23 RX ORDER — DILTIAZEM HYDROCHLORIDE 120 MG/1
120 CAPSULE, EXTENDED RELEASE ORAL DAILY
Qty: 90 CAPSULE | Refills: 3 | Status: SHIPPED | OUTPATIENT
Start: 2024-08-23

## 2024-08-30 ENCOUNTER — APPOINTMENT (OUTPATIENT)
Dept: PRIMARY CARE | Facility: CLINIC | Age: 84
End: 2024-08-30
Payer: COMMERCIAL

## 2024-08-30 VITALS
DIASTOLIC BLOOD PRESSURE: 80 MMHG | HEART RATE: 86 BPM | WEIGHT: 162 LBS | TEMPERATURE: 98 F | SYSTOLIC BLOOD PRESSURE: 148 MMHG | BODY MASS INDEX: 26.15 KG/M2

## 2024-08-30 DIAGNOSIS — I10 BENIGN ESSENTIAL HTN: Chronic | ICD-10-CM

## 2024-08-30 DIAGNOSIS — E78.00 HYPERCHOLESTEROLEMIA: ICD-10-CM

## 2024-08-30 DIAGNOSIS — Z23 NEED FOR PNEUMOCOCCAL VACCINE: ICD-10-CM

## 2024-08-30 DIAGNOSIS — R73.03 PREDIABETES: ICD-10-CM

## 2024-08-30 DIAGNOSIS — Z00.00 MEDICARE ANNUAL WELLNESS VISIT, SUBSEQUENT: Primary | ICD-10-CM

## 2024-08-30 DIAGNOSIS — R41.3 MEMORY LOSS: ICD-10-CM

## 2024-08-30 PROCEDURE — 1160F RVW MEDS BY RX/DR IN RCRD: CPT | Performed by: INTERNAL MEDICINE

## 2024-08-30 PROCEDURE — 99397 PER PM REEVAL EST PAT 65+ YR: CPT | Performed by: INTERNAL MEDICINE

## 2024-08-30 PROCEDURE — G0439 PPPS, SUBSEQ VISIT: HCPCS | Performed by: INTERNAL MEDICINE

## 2024-08-30 PROCEDURE — 3077F SYST BP >= 140 MM HG: CPT | Performed by: INTERNAL MEDICINE

## 2024-08-30 PROCEDURE — 3079F DIAST BP 80-89 MM HG: CPT | Performed by: INTERNAL MEDICINE

## 2024-08-30 PROCEDURE — 90677 PCV20 VACCINE IM: CPT | Performed by: INTERNAL MEDICINE

## 2024-08-30 PROCEDURE — 90471 IMMUNIZATION ADMIN: CPT | Performed by: INTERNAL MEDICINE

## 2024-08-30 PROCEDURE — 1170F FXNL STATUS ASSESSED: CPT | Performed by: INTERNAL MEDICINE

## 2024-08-30 PROCEDURE — 1036F TOBACCO NON-USER: CPT | Performed by: INTERNAL MEDICINE

## 2024-08-30 PROCEDURE — 1159F MED LIST DOCD IN RCRD: CPT | Performed by: INTERNAL MEDICINE

## 2024-08-30 RX ORDER — DILTIAZEM HYDROCHLORIDE 120 MG/1
120 CAPSULE, EXTENDED RELEASE ORAL DAILY
Qty: 90 CAPSULE | Refills: 3 | Status: SHIPPED | OUTPATIENT
Start: 2024-08-30

## 2024-08-30 RX ORDER — EZETIMIBE 10 MG/1
10 TABLET ORAL DAILY
Qty: 90 TABLET | Refills: 3 | Status: SHIPPED | OUTPATIENT
Start: 2024-08-30 | End: 2025-08-30

## 2024-08-30 ASSESSMENT — ACTIVITIES OF DAILY LIVING (ADL)
MANAGING_FINANCES: NEEDS ASSISTANCE
BATHING: INDEPENDENT
TAKING_MEDICATION: INDEPENDENT
DOING_HOUSEWORK: NEEDS ASSISTANCE
DRESSING: INDEPENDENT
GROCERY_SHOPPING: NEEDS ASSISTANCE

## 2024-08-30 ASSESSMENT — ENCOUNTER SYMPTOMS
OCCASIONAL FEELINGS OF UNSTEADINESS: 0
POLYDIPSIA: 0
COUGH: 0
DEPRESSION: 0
PALPITATIONS: 0
FEVER: 0
SHORTNESS OF BREATH: 0
LOSS OF SENSATION IN FEET: 0
CHILLS: 0

## 2024-08-30 NOTE — PROGRESS NOTES
Subjective   Reason for Visit: Wilma Lazcano is an 84 y.o. female here for a Medicare Wellness visit.     Past Medical, Surgical, and Family History reviewed and updated in chart.    Reviewed all medications by prescribing practitioner or clinical pharmacist (such as prescriptions, OTCs, herbal therapies and supplements) and documented in the medical record.    Patient presents today for an annual wellness exam    Medical history and surgical history updated today  Medication list reconciled and updated  Patient denies vision issues at this time: cataract under eval- follow up in OCT  Patient denies hearing issues at this time: declines evaluation  Follows with a dentist: No,     Patient counseled about available preventative health vaccinations and provided with opportunity to update them with our office or through prescription to preferred pharmacy.  Prevnar 20 advised today    Reviewed and discussed preventative health screening recommendations for colon cancer: age completed    Reviewed and discussed preventative health recommendations for screening for cervical cancer and breast cancer: age completed    Family present and available for daily assistance in setting of cognitive decline.  Son lives downstairs. No reported behavioral disturbance.     Patient continues to exhibit signs and symptoms of memory loss and cognitive decline, she has a strong family history of Alzheimer's disease and is suspected to be at the early stages of it herself.  She has a good support system around her with family who live with her and are regularly visiting and assisting her with her needs such as shopping getting to appointments and checking up on her needs.  She is able to manage at home with essential ADLs at this time.  There are no significant signs of rapid decline.  There is no agitation or combative behavior.  Continue the current course, discussed medication for memory which was declined at this time.        Patient  Care Team:  Shawn Ortiz MD as PCP - General (Internal Medicine)  Shawn Ortiz MD as PCP - [facility] PCP     Review of Systems   Constitutional:  Negative for chills and fever.   Respiratory:  Negative for cough and shortness of breath.    Cardiovascular:  Negative for chest pain and palpitations.   Endocrine: Negative for polydipsia and polyuria.       Objective   Vitals:  /80   Pulse 86   Temp 36.7 °C (98 °F)   Wt 73.5 kg (162 lb)   BMI 26.15 kg/m²       Physical Exam  Constitutional:       Appearance: Normal appearance.   HENT:      Head: Normocephalic and atraumatic.      Right Ear: Tympanic membrane normal.      Left Ear: Tympanic membrane normal.      Nose: Nose normal.      Mouth/Throat:      Mouth: Mucous membranes are moist.   Eyes:      Extraocular Movements: Extraocular movements intact.      Conjunctiva/sclera: Conjunctivae normal.      Pupils: Pupils are equal, round, and reactive to light.   Neck:      Thyroid: No thyroid mass, thyromegaly or thyroid tenderness.      Vascular: No carotid bruit.   Cardiovascular:      Rate and Rhythm: Normal rate and regular rhythm.      Heart sounds: No murmur heard.     No friction rub. No gallop.   Pulmonary:      Effort: Pulmonary effort is normal. No respiratory distress.      Breath sounds: No wheezing, rhonchi or rales.   Abdominal:      General: Bowel sounds are normal.      Palpations: Abdomen is soft.      Tenderness: There is no abdominal tenderness. There is no guarding.      Hernia: No hernia is present.   Musculoskeletal:      Cervical back: Neck supple. No tenderness.      Right lower leg: No edema.      Left lower leg: No edema.   Lymphadenopathy:      Cervical: No cervical adenopathy.   Skin:     Coloration: Skin is not jaundiced.   Neurological:      General: No focal deficit present.      Mental Status: She is alert and oriented to person, place, and time.   Psychiatric:         Mood and Affect: Mood normal.         Assessment/Plan    Problem List Items Addressed This Visit             ICD-10-CM    Benign essential HTN (Chronic) I10    Relevant Medications    dilTIAZem XR (Dilacor XR) 120 mg 24 hr capsule    Hypercholesterolemia E78.00    Relevant Medications    ezetimibe (Zetia) 10 mg tablet    Prediabetes R73.03    Memory loss R41.3     Other Visit Diagnoses         Codes    Medicare annual wellness visit, subsequent    -  Primary Z00.00    Need for pneumococcal vaccine     Z23    Relevant Orders    Pneumococcal conjugate vaccine, 20-valent (PREVNAR 20)

## 2024-10-02 ENCOUNTER — APPOINTMENT (OUTPATIENT)
Dept: OPHTHALMOLOGY | Facility: CLINIC | Age: 84
End: 2024-10-02
Payer: COMMERCIAL

## 2024-10-02 DIAGNOSIS — H40.1132 PRIMARY OPEN ANGLE GLAUCOMA (POAG) OF BOTH EYES, MODERATE STAGE: ICD-10-CM

## 2024-10-02 DIAGNOSIS — H43.12 VITREOUS HEMORRHAGE OF LEFT EYE (MULTI): ICD-10-CM

## 2024-10-02 DIAGNOSIS — H25.12 AGE-RELATED NUCLEAR CATARACT OF LEFT EYE: ICD-10-CM

## 2024-10-02 DIAGNOSIS — H40.1123 PRIMARY OPEN ANGLE GLAUCOMA OF LEFT EYE, SEVERE STAGE: Primary | ICD-10-CM

## 2024-10-02 DIAGNOSIS — Z96.1 PSEUDOPHAKIA OF RIGHT EYE: ICD-10-CM

## 2024-10-02 LAB — AVERAGE RNFL TODAY (OS): 42

## 2024-10-02 PROCEDURE — 99204 OFFICE O/P NEW MOD 45 MIN: CPT | Performed by: OPHTHALMOLOGY

## 2024-10-02 PROCEDURE — 92133 CPTRZD OPH DX IMG PST SGM ON: CPT | Performed by: OPHTHALMOLOGY

## 2024-10-02 PROCEDURE — 92020 GONIOSCOPY: CPT | Performed by: OPHTHALMOLOGY

## 2024-10-02 RX ORDER — LATANOPROST 50 UG/ML
1 SOLUTION/ DROPS OPHTHALMIC NIGHTLY
Qty: 2.5 ML | Refills: 11 | Status: SHIPPED | OUTPATIENT
Start: 2024-10-02 | End: 2024-11-01

## 2024-10-02 ASSESSMENT — GONIOSCOPY
OD_TEMPORAL: D45R 2+ PTM
OS_INFERIOR: D30R 2+ PTM
OD_SUPERIOR: D45R 2+ PTM
OS_TEMPORAL: D30R 2+ PTM
OD_INFERIOR: D45R 2+ PTM
OD_NASAL: D45R 2+ PTM
OS_NASAL: D30R 2+ PTM
OS_SUPERIOR: D30R 2+ PTM

## 2024-10-02 ASSESSMENT — VISUAL ACUITY
OD_CC: LP
METHOD: SNELLEN - LINEAR
OS_CC: 20/60
OS_PH_CC: 20/40

## 2024-10-02 ASSESSMENT — CONF VISUAL FIELD
OS_INFERIOR_TEMPORAL_RESTRICTION: 3
OS_SUPERIOR_NASAL_RESTRICTION: 3
OS_INFERIOR_NASAL_RESTRICTION: 3
OD_INFERIOR_NASAL_RESTRICTION: 1
OD_SUPERIOR_NASAL_RESTRICTION: 1
OS_SUPERIOR_TEMPORAL_RESTRICTION: 3
OD_SUPERIOR_TEMPORAL_RESTRICTION: 1
OD_INFERIOR_TEMPORAL_RESTRICTION: 1

## 2024-10-02 ASSESSMENT — TONOMETRY
IOP_METHOD: GOLDMANN APPLANATION
OD_IOP_MMHG: 16
OS_IOP_MMHG: 26

## 2024-10-02 ASSESSMENT — SLIT LAMP EXAM - LIDS
COMMENTS: GOOD POSITION
COMMENTS: GOOD POSITION

## 2024-10-02 ASSESSMENT — PACHYMETRY
OD_CT(UM): 584
OS_CT(UM): 557

## 2024-10-02 ASSESSMENT — EXTERNAL EXAM - RIGHT EYE: OD_EXAM: NORMAL

## 2024-10-02 ASSESSMENT — EXTERNAL EXAM - LEFT EYE: OS_EXAM: NORMAL

## 2024-10-02 ASSESSMENT — CUP TO DISC RATIO: OS_RATIO: .9

## 2024-10-02 ASSESSMENT — ENCOUNTER SYMPTOMS: EYES NEGATIVE: 1

## 2024-10-02 NOTE — PROGRESS NOTES
"Visual Acuity (Snellen - Linear)         Right Left    Dist cc LP 20/60    Dist ph cc  20/40          Tonometry       Tonometry (Goldmann Applanation, 11:03 AM)         Right Left    Pressure 16 26                  Assessment/Plan   Last dilated:  10/2/24  1.  Primary open angle glaucoma OU:  /557.  Pt's last visit was with Dr. Torres 3 years ago.  Pt and care giver state that she has not used her drops for \"several years\".  IOP is not controlled OS.  Pt unable to stay for HVF today.  Constricted on confrontational visual fields left eye and significant progression on RNFL testing.  Pt was on Vyzylta and Betimol left eye in the past.  Given compliance issues, will likely have initial Rx be one QD drop + SLT.  Will start with drop first as will have lower risk with subsequent SLT and can also test compliance      Plan:  start latanoprost OU QHS                F/u 1 week (HVF)    2. Pseudophakia (PCIOL) OD / Cataract OS:  mildly visually significant OS.  s/p) posterior chamber intraocular lens (PCIOL) 03/2021      Plan:  monitor     3. Vitreous Hemorrhage OD:  Not acute and level of loss of hemoglobin likely 1-2 months ago.  Pt's caregiver states she fell ~6 weeks ago and since then vision acutely went down OD.  Preliminary B-Scan OD today (10/2/24) did not show a retinal detachment.  Etiology unclear.      Plan:  consult Dr. Garza 1 week    "

## 2024-10-07 ENCOUNTER — OFFICE VISIT (OUTPATIENT)
Dept: OPHTHALMOLOGY | Facility: CLINIC | Age: 84
End: 2024-10-07
Payer: COMMERCIAL

## 2024-10-07 ENCOUNTER — APPOINTMENT (OUTPATIENT)
Dept: OPHTHALMOLOGY | Facility: CLINIC | Age: 84
End: 2024-10-07
Payer: COMMERCIAL

## 2024-10-07 DIAGNOSIS — H43.11 VITREOUS HEMORRHAGE, RIGHT EYE (MULTI): Primary | ICD-10-CM

## 2024-10-07 DIAGNOSIS — H35.352 CYSTOID MACULAR EDEMA OF LEFT EYE: ICD-10-CM

## 2024-10-07 DIAGNOSIS — H43.12 VITREOUS HEMORRHAGE OF LEFT EYE (MULTI): Primary | ICD-10-CM

## 2024-10-07 PROCEDURE — 92134 CPTRZ OPH DX IMG PST SGM RTA: CPT | Performed by: OPHTHALMOLOGY

## 2024-10-07 PROCEDURE — 99213 OFFICE O/P EST LOW 20 MIN: CPT | Performed by: OPHTHALMOLOGY

## 2024-10-07 ASSESSMENT — PACHYMETRY
OD_CT(UM): 584
OS_CT(UM): 557

## 2024-10-07 ASSESSMENT — VISUAL ACUITY
OS_SC: 20/80
OS_PH_CC+: +1
OS_SC+: +1
OD_CC: LP
METHOD: SNELLEN - LINEAR
OS_PH_CC: 20/50

## 2024-10-07 ASSESSMENT — SLIT LAMP EXAM - LIDS
COMMENTS: GOOD POSITION
COMMENTS: GOOD POSITION

## 2024-10-07 ASSESSMENT — CONF VISUAL FIELD
OD_NORMAL: 1
OD_INFERIOR_NASAL_RESTRICTION: 0
OS_INFERIOR_NASAL_RESTRICTION: 0
OS_NORMAL: 1
OS_SUPERIOR_TEMPORAL_RESTRICTION: 0
OS_INFERIOR_TEMPORAL_RESTRICTION: 0
OD_INFERIOR_TEMPORAL_RESTRICTION: 0
OS_SUPERIOR_NASAL_RESTRICTION: 0
OD_SUPERIOR_TEMPORAL_RESTRICTION: 0
OD_SUPERIOR_NASAL_RESTRICTION: 0

## 2024-10-07 ASSESSMENT — ENCOUNTER SYMPTOMS
CONSTITUTIONAL NEGATIVE: 0
RESPIRATORY NEGATIVE: 0
GASTROINTESTINAL NEGATIVE: 0
ENDOCRINE NEGATIVE: 0
EYES NEGATIVE: 1
CARDIOVASCULAR NEGATIVE: 0
NEUROLOGICAL NEGATIVE: 0
ALLERGIC/IMMUNOLOGIC NEGATIVE: 0
PSYCHIATRIC NEGATIVE: 0
HEMATOLOGIC/LYMPHATIC NEGATIVE: 0
MUSCULOSKELETAL NEGATIVE: 0

## 2024-10-07 ASSESSMENT — TONOMETRY
OD_IOP_MMHG: 20
IOP_METHOD: GOLDMANN APPLANATION
OS_IOP_MMHG: 24

## 2024-10-07 ASSESSMENT — CUP TO DISC RATIO: OS_RATIO: .9

## 2024-10-07 ASSESSMENT — EXTERNAL EXAM - LEFT EYE: OS_EXAM: NORMAL

## 2024-10-07 ASSESSMENT — EXTERNAL EXAM - RIGHT EYE: OD_EXAM: NORMAL

## 2024-10-07 NOTE — PROGRESS NOTES
"Subjective   Patient ID: Wilma Lazcano is a 84 y.o. female.        Current Outpatient Medications (Ophthalmology pharm classes)   Medication Sig Dispense Refill    latanoprost (Xalatan) 0.005 % ophthalmic solution Administer 1 drop into both eyes once daily at bedtime. 2.5 mL 11     Current Outpatient Medications (Other)   Medication Sig Dispense Refill    dilTIAZem XR (Dilacor XR) 120 mg 24 hr capsule Take 1 capsule (120 mg) by mouth once daily. 90 capsule 3    ezetimibe (Zetia) 10 mg tablet Take 1 tablet (10 mg) by mouth once daily. 90 tablet 3       Objective   Not recorded         Assessment/Plan   {Assess/PlanSmartLinks:98096}    Referral from Dr. Avelar seen 10/2/24    ***    Last dilated:  10/2/24  1.  Primary open angle glaucoma OU:  /557.  Pt's last visit was with Dr. Torres 3 years ago.  Pt and care giver state that she has not used her drops for \"several years\".  IOP is not controlled OS.  Pt unable to stay for HVF today.  Constricted on confrontational visual fields left eye and significant progression on RNFL testing.  Pt was on Vyzylta and Betimol left eye in the past.  Given compliance issues, will likely have initial Rx be one QD drop + SLT.  Will start with drop first as will have lower risk with subsequent SLT and can also test compliance      Plan:  start latanoprost OU QHS                F/u 1 week (HVF)    2. Pseudophakia (PCIOL) OD / Cataract OS:  mildly visually significant OS.  s/p) posterior chamber intraocular lens (PCIOL) 03/2021      Plan:  monitor     3. Vitreous Hemorrhage OD:  Not acute and level of loss of hemoglobin likely 1-2 months ago.  Pt's caregiver states she fell ~6 weeks ago and since then vision acutely went down OD.  Preliminary B-Scan OD today (10/2/24) did not show a retinal detachment.  Etiology unclear.      Plan:  consult Dr. Garza 1 week    "

## 2024-10-07 NOTE — PROGRESS NOTES
Subjective   Patient ID: Wilma Lazcano is a 84 y.o. female.    Chief Complaint    Vitreous Hemorrhage        HPI    84 year old female present for Vitreous Hemorrhage,referred by Dr. Avelar. Pt has hx of cataract sx OD over 5 years ago, pt has unknown hx of any glaucoma or macular degeneration,  pt states no concerns.   Last edited by Justine García on 10/7/2024  9:11 AM.        Current Outpatient Medications (Ophthalmology pharm classes)   Medication Sig Dispense Refill    latanoprost (Xalatan) 0.005 % ophthalmic solution Administer 1 drop into both eyes once daily at bedtime. 2.5 mL 11     Current Outpatient Medications (Other)   Medication Sig Dispense Refill    dilTIAZem XR (Dilacor XR) 120 mg 24 hr capsule Take 1 capsule (120 mg) by mouth once daily. 90 capsule 3    ezetimibe (Zetia) 10 mg tablet Take 1 tablet (10 mg) by mouth once daily. 90 tablet 3       Objective   Base Eye Exam       Visual Acuity (Snellen - Linear)         Right Left    Dist sc  20/80 +1    Dist cc LP     Dist ph cc  20/50 +1              Tonometry (Goldmann Applanation, 9:18 AM)         Right Left    Pressure 20 24              Pupils         Pupils    Right PERRL, No APD    Left PERRL, No APD              Visual Fields         Left Right     Full Full              Extraocular Movement         Right Left     Full Full              Neuro/Psych       Oriented x3: Yes              Dilation       Both eyes: 1% Mydriacyl & 2.5% Jaret  @ 9:18 AM                  Slit Lamp and Fundus Exam       External Exam         Right Left    External Normal Normal              Slit Lamp Exam         Right Left    Lids/Lashes Good Position Good Position    Conjunctiva/Sclera White and quiet White and quiet    Cornea Clear Clear    Anterior Chamber Deep and quiet Deep and quiet    Iris Round and reactive Round and reactive    Lens iol pco 2+ 1+ NS with ACC not in visual axis    Anterior Vitreous No view, vitreous hemorrhage Clear              Fundus Exam          Right Left    Disc  Pink and sharp    C/D Ratio  .9    Macula  blunted reflex    Vessels  Normal    Periphery  supratemporal retinal hemorrhages in periphery, some apparent collateral vessels                  Imaging    Macula OCT 10/10/24  Right eye (OD): No view  Left eye (OS): IRF and retinal thickening in inferior macula      Assessment/Plan       Referral from Dr. Avelar seen 10/2/24    -Report decrease in vision right eye starting about 1 year ago, fell about 7 weeks ago, but states vision did not significantly change after the fall  -No pain  -No changes recent left eye  -HTN, HLD     #Vitreous hemorrhage right eye  -Dense VH right eye - unclear etiology and length of time  -Denies history of diabetes mellitus (DM)  -Reports vision aobut the same as prior to the fall, but vision had been significantly worse for about 1 year (reports about 1 year ago lost her  and stopped taking her eye drops for a period) - may have had worsening of glaucoma that limited vision even prior to vitreous hemorrhage  -Will closely monitor, plan for follow up 2 weeks - if no improvement, proceed with pars plana vitrectomy (PPV) possible endolaser (EL) right eye    #Macular edema left eye  #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 Reports no vision changes left eye. Edema appears to be just inferior to fovea but still with a foveal contour. Suspect likely branch retinal vein occlusion (BRVO), unclear onset. Recommended close monitoring of HTN and HLD, will alert PCP for further systemic work up. Disucssed option of anti-VEGF left eye. 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 Avelar    #Pseudophakia (PCIOL) OD / Cataract OS:    -Managed by Dr. Avelar      3. Vitreous Hemorrhage OD:  Not acute and level of loss of hemoglobin likely 1-2 months ago.  Pt's caregiver states she fell ~6 weeks ago and since then vision acutely went down OD.  Preliminary B-Scan OD today (10/2/24) did not show a retinal detachment.  Etiology unclear.      Plan:  consult Dr. Garza 1 week

## 2024-10-09 ENCOUNTER — APPOINTMENT (OUTPATIENT)
Dept: OPHTHALMOLOGY | Facility: CLINIC | Age: 84
End: 2024-10-09
Payer: COMMERCIAL

## 2024-10-21 ENCOUNTER — APPOINTMENT (OUTPATIENT)
Dept: OPHTHALMOLOGY | Facility: CLINIC | Age: 84
End: 2024-10-21
Payer: COMMERCIAL

## 2024-11-10 NOTE — PROGRESS NOTES
Subjective   Patient ID: Wilma Lazcano is a 84 y.o. female.    Chief Complaint    Vitreous hemorrhage of left eye        HPI    No changes with VA being noticed OS,No eye pain,No floaters,No flashes, latanoprost OU at bedtime.  Last edited by ANNE Galvan on 11/11/2024  8:55 AM.        No current outpatient medications on file. (Ophthalmology pharm classes)       Current Outpatient Medications (Other)   Medication Sig Dispense Refill    dilTIAZem XR (Dilacor XR) 120 mg 24 hr capsule Take 1 capsule (120 mg) by mouth once daily. 90 capsule 3    ezetimibe (Zetia) 10 mg tablet Take 1 tablet (10 mg) by mouth once daily. 90 tablet 3       Objective   Base Eye Exam       Visual Acuity (Snellen - Linear)         Right Left    Dist cc LP 20/50 -1    Dist ph cc  NI      Correction: Glasses              Tonometry (Goldmann Applanation, 8:59 AM)         Right Left    Pressure 15 15              Pupils         Pupils    Right PERRL, No APD    Left PERRL, No APD              Extraocular Movement         Right Left     Full Full              Neuro/Psych       Oriented x3: Yes    Mood/Affect: Normal              Dilation       Both eyes: 1% Mydriacyl & 2.5% Jaret  @ 8:59 AM                  Imaging    Macula OCT 11/11/24  Right eye (OD): unable  Left eye (OS): central IRF/edema with altered contour, unchanged vs previous    Assessment/Plan       Referral from Dr. Avelar seen 10/2/24    seen 10/7/24    -Report decrease in vision right eye starting about 1 year ago, fell about 3 mon ago, but states vision did not significantly change after the fall  -No pain  -No changes recent left eye  -HTN, HLD     #Vitreous hemorrhage right eye  -Dense VH right eye - unclear etiology and length of time  -Denies history of diabetes mellitus (DM)  -Reports vision aobut the same as prior to the fall, but vision had been significantly worse for about 1 year (reports about 1 year ago lost her  and stopped taking her eye drops for a  period) - may have had worsening of glaucoma that limited vision even prior to vitreous hemorrhage  -Still with diffuse VH right eye today - plan pars plana vitrectomy (PPV)/endolaser (EL) within next 1-2 weeks    #Macular edema left eye  #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 Reports no vision changes left eye.   -Edema still present today  -Recommend initiation of anti-VEGF treatment - patient/family prefers that first injection left eye be done at time of upcoming PPV surgery, will arrange    # Primary open angle glaucoma both eyes  -Managed by Dr. Avelar  -intraocular pressure (IOP) improved left eye today to 15    #Pseudophakia (PCIOL) OD / Cataract OS:    -Managed by Dr. Avelar    Plan: pars plana vitrectomy (PPV) possible endolaser (EL) right eye, intravitreal anti-VEGF (Avastin) left eye in OR at same time

## 2024-11-11 ENCOUNTER — OFFICE VISIT (OUTPATIENT)
Dept: OPHTHALMOLOGY | Facility: CLINIC | Age: 84
End: 2024-11-11
Payer: COMMERCIAL

## 2024-11-11 DIAGNOSIS — H43.12 VITREOUS HEMORRHAGE OF LEFT EYE (MULTI): Primary | ICD-10-CM

## 2024-11-11 DIAGNOSIS — H43.11 VITREOUS HEMORRHAGE, RIGHT EYE (MULTI): ICD-10-CM

## 2024-11-11 DIAGNOSIS — H34.8320 BRANCH RETINAL VEIN OCCLUSION OF LEFT EYE WITH MACULAR EDEMA: ICD-10-CM

## 2024-11-11 PROCEDURE — 1036F TOBACCO NON-USER: CPT | Performed by: OPHTHALMOLOGY

## 2024-11-11 PROCEDURE — 99213 OFFICE O/P EST LOW 20 MIN: CPT | Performed by: OPHTHALMOLOGY

## 2024-11-11 PROCEDURE — 92134 CPTRZ OPH DX IMG PST SGM RTA: CPT | Performed by: OPHTHALMOLOGY

## 2024-11-11 PROCEDURE — 1159F MED LIST DOCD IN RCRD: CPT | Performed by: OPHTHALMOLOGY

## 2024-11-11 RX ORDER — PROPARACAINE HYDROCHLORIDE 5 MG/ML
1 SOLUTION/ DROPS OPHTHALMIC ONCE
OUTPATIENT
Start: 2024-11-11 | End: 2024-11-11

## 2024-11-11 RX ORDER — TROPICAMIDE 10 MG/ML
1 SOLUTION/ DROPS OPHTHALMIC
OUTPATIENT
Start: 2024-11-11 | End: 2024-11-11

## 2024-11-11 RX ORDER — PHENYLEPHRINE HYDROCHLORIDE 25 MG/ML
1 SOLUTION/ DROPS OPHTHALMIC
OUTPATIENT
Start: 2024-11-11 | End: 2024-11-11

## 2024-11-11 ASSESSMENT — VISUAL ACUITY
METHOD: SNELLEN - LINEAR
OS_CC: 20/50
CORRECTION_TYPE: GLASSES
OS_CC+: -1
OD_CC: LP

## 2024-11-11 ASSESSMENT — ENCOUNTER SYMPTOMS
PSYCHIATRIC NEGATIVE: 0
CONSTITUTIONAL NEGATIVE: 0
EYES NEGATIVE: 0
NEUROLOGICAL NEGATIVE: 0
HEMATOLOGIC/LYMPHATIC NEGATIVE: 0
ENDOCRINE NEGATIVE: 0
CARDIOVASCULAR NEGATIVE: 0
RESPIRATORY NEGATIVE: 0
ALLERGIC/IMMUNOLOGIC NEGATIVE: 0
MUSCULOSKELETAL NEGATIVE: 0
GASTROINTESTINAL NEGATIVE: 0

## 2024-11-11 ASSESSMENT — PACHYMETRY
OS_CT(UM): 557
OD_CT(UM): 584

## 2024-11-11 ASSESSMENT — EXTERNAL EXAM - RIGHT EYE: OD_EXAM: NORMAL

## 2024-11-11 ASSESSMENT — TONOMETRY
OS_IOP_MMHG: 15
OD_IOP_MMHG: 15
IOP_METHOD: GOLDMANN APPLANATION

## 2024-11-11 ASSESSMENT — SLIT LAMP EXAM - LIDS
COMMENTS: GOOD POSITION
COMMENTS: GOOD POSITION

## 2024-11-11 ASSESSMENT — CUP TO DISC RATIO: OS_RATIO: .9

## 2024-11-11 ASSESSMENT — EXTERNAL EXAM - LEFT EYE: OS_EXAM: NORMAL

## 2024-11-18 ENCOUNTER — APPOINTMENT (OUTPATIENT)
Dept: OPHTHALMOLOGY | Facility: CLINIC | Age: 84
End: 2024-11-18
Payer: COMMERCIAL

## 2024-11-18 PROBLEM — H34.8320 BRANCH RETINAL VEIN OCCLUSION OF LEFT EYE WITH MACULAR EDEMA: Status: ACTIVE | Noted: 2024-11-11

## 2024-11-18 PROBLEM — H43.11 VITREOUS HEMORRHAGE, RIGHT EYE (MULTI): Status: ACTIVE | Noted: 2024-11-11

## 2024-11-29 ENCOUNTER — ANESTHESIA EVENT (OUTPATIENT)
Dept: OPERATING ROOM | Facility: CLINIC | Age: 84
End: 2024-11-29
Payer: COMMERCIAL

## 2024-12-02 ENCOUNTER — ANESTHESIA (OUTPATIENT)
Dept: OPERATING ROOM | Facility: CLINIC | Age: 84
End: 2024-12-02
Payer: COMMERCIAL

## 2024-12-02 ENCOUNTER — HOSPITAL ENCOUNTER (OUTPATIENT)
Facility: CLINIC | Age: 84
Setting detail: OUTPATIENT SURGERY
Discharge: HOME | End: 2024-12-02
Attending: OPHTHALMOLOGY | Admitting: OPHTHALMOLOGY
Payer: COMMERCIAL

## 2024-12-02 VITALS
OXYGEN SATURATION: 98 % | RESPIRATION RATE: 16 BRPM | TEMPERATURE: 97.2 F | HEART RATE: 78 BPM | DIASTOLIC BLOOD PRESSURE: 78 MMHG | WEIGHT: 162 LBS | HEIGHT: 66 IN | BODY MASS INDEX: 26.03 KG/M2 | SYSTOLIC BLOOD PRESSURE: 152 MMHG

## 2024-12-02 DIAGNOSIS — H34.8320 BRANCH RETINAL VEIN OCCLUSION OF LEFT EYE WITH MACULAR EDEMA: ICD-10-CM

## 2024-12-02 DIAGNOSIS — H43.11 VITREOUS HEMORRHAGE, RIGHT EYE (MULTI): ICD-10-CM

## 2024-12-02 DIAGNOSIS — H43.12 VITREOUS HEMORRHAGE OF LEFT EYE (MULTI): Primary | ICD-10-CM

## 2024-12-02 PROCEDURE — 2500000005 HC RX 250 GENERAL PHARMACY W/O HCPCS: Performed by: OPHTHALMOLOGY

## 2024-12-02 PROCEDURE — 2500000004 HC RX 250 GENERAL PHARMACY W/ HCPCS (ALT 636 FOR OP/ED): Performed by: OPHTHALMOLOGY

## 2024-12-02 PROCEDURE — 67040 LASER TREATMENT OF RETINA: CPT | Performed by: OPHTHALMOLOGY

## 2024-12-02 PROCEDURE — A67036 PR VITRECTOMY,MECHANICAL: Performed by: ANESTHESIOLOGY

## 2024-12-02 PROCEDURE — 3700000002 HC GENERAL ANESTHESIA TIME - EACH INCREMENTAL 1 MINUTE: Performed by: OPHTHALMOLOGY

## 2024-12-02 PROCEDURE — 2720000007 HC OR 272 NO HCPCS: Performed by: OPHTHALMOLOGY

## 2024-12-02 PROCEDURE — 7100000010 HC PHASE TWO TIME - EACH INCREMENTAL 1 MINUTE: Performed by: OPHTHALMOLOGY

## 2024-12-02 PROCEDURE — 2500000004 HC RX 250 GENERAL PHARMACY W/ HCPCS (ALT 636 FOR OP/ED): Performed by: NURSE ANESTHETIST, CERTIFIED REGISTERED

## 2024-12-02 PROCEDURE — A67036 PR VITRECTOMY,MECHANICAL: Performed by: NURSE ANESTHETIST, CERTIFIED REGISTERED

## 2024-12-02 PROCEDURE — 3700000001 HC GENERAL ANESTHESIA TIME - INITIAL BASE CHARGE: Performed by: OPHTHALMOLOGY

## 2024-12-02 PROCEDURE — 99100 ANES PT EXTEME AGE<1 YR&>70: CPT | Performed by: ANESTHESIOLOGY

## 2024-12-02 PROCEDURE — 3600000008 HC OR TIME - EACH INCREMENTAL 1 MINUTE - PROCEDURE LEVEL THREE: Performed by: OPHTHALMOLOGY

## 2024-12-02 PROCEDURE — 67028 INJECTION EYE DRUG: CPT | Performed by: OPHTHALMOLOGY

## 2024-12-02 PROCEDURE — 7100000009 HC PHASE TWO TIME - INITIAL BASE CHARGE: Performed by: OPHTHALMOLOGY

## 2024-12-02 PROCEDURE — 3600000003 HC OR TIME - INITIAL BASE CHARGE - PROCEDURE LEVEL THREE: Performed by: OPHTHALMOLOGY

## 2024-12-02 RX ORDER — BUPIVACAINE HYDROCHLORIDE 7.5 MG/ML
INJECTION, SOLUTION EPIDURAL; RETROBULBAR AS NEEDED
Status: DISCONTINUED | OUTPATIENT
Start: 2024-12-02 | End: 2024-12-02 | Stop reason: HOSPADM

## 2024-12-02 RX ORDER — PROPOFOL 10 MG/ML
INJECTION, EMULSION INTRAVENOUS AS NEEDED
Status: DISCONTINUED | OUTPATIENT
Start: 2024-12-02 | End: 2024-12-02

## 2024-12-02 RX ORDER — TROPICAMIDE 10 MG/ML
1 SOLUTION/ DROPS OPHTHALMIC
Status: COMPLETED | OUTPATIENT
Start: 2024-12-02 | End: 2024-12-02

## 2024-12-02 RX ORDER — POVIDONE-IODINE 5 %
SOLUTION, NON-ORAL OPHTHALMIC (EYE) AS NEEDED
Status: DISCONTINUED | OUTPATIENT
Start: 2024-12-02 | End: 2024-12-02 | Stop reason: HOSPADM

## 2024-12-02 RX ORDER — MIDAZOLAM HYDROCHLORIDE 1 MG/ML
INJECTION, SOLUTION INTRAMUSCULAR; INTRAVENOUS CONTINUOUS PRN
Status: DISCONTINUED | OUTPATIENT
Start: 2024-12-02 | End: 2024-12-02

## 2024-12-02 RX ORDER — PHENYLEPHRINE HYDROCHLORIDE 25 MG/ML
1 SOLUTION/ DROPS OPHTHALMIC
Status: COMPLETED | OUTPATIENT
Start: 2024-12-02 | End: 2024-12-02

## 2024-12-02 RX ORDER — TRIAMCINOLONE ACETONIDE 40 MG/ML
INJECTION, SUSPENSION INTRA-ARTICULAR; INTRAMUSCULAR AS NEEDED
Status: DISCONTINUED | OUTPATIENT
Start: 2024-12-02 | End: 2024-12-02 | Stop reason: HOSPADM

## 2024-12-02 RX ORDER — METOCLOPRAMIDE HYDROCHLORIDE 5 MG/ML
10 INJECTION INTRAMUSCULAR; INTRAVENOUS ONCE AS NEEDED
Status: DISCONTINUED | OUTPATIENT
Start: 2024-12-02 | End: 2024-12-02 | Stop reason: HOSPADM

## 2024-12-02 RX ORDER — LIDOCAINE HYDROCHLORIDE 10 MG/ML
0.1 INJECTION, SOLUTION EPIDURAL; INFILTRATION; INTRACAUDAL; PERINEURAL ONCE
Status: DISCONTINUED | OUTPATIENT
Start: 2024-12-02 | End: 2024-12-02 | Stop reason: HOSPADM

## 2024-12-02 RX ORDER — DEXAMETHASONE SODIUM PHOSPHATE 10 MG/ML
INJECTION INTRAMUSCULAR; INTRAVENOUS AS NEEDED
Status: DISCONTINUED | OUTPATIENT
Start: 2024-12-02 | End: 2024-12-02 | Stop reason: HOSPADM

## 2024-12-02 RX ORDER — VANCOMYCIN HYDROCHLORIDE 1 G/20ML
INJECTION, POWDER, LYOPHILIZED, FOR SOLUTION INTRAVENOUS AS NEEDED
Status: DISCONTINUED | OUTPATIENT
Start: 2024-12-02 | End: 2024-12-02 | Stop reason: HOSPADM

## 2024-12-02 RX ORDER — FENTANYL CITRATE 50 UG/ML
50 INJECTION, SOLUTION INTRAMUSCULAR; INTRAVENOUS EVERY 5 MIN PRN
Status: DISCONTINUED | OUTPATIENT
Start: 2024-12-02 | End: 2024-12-02 | Stop reason: HOSPADM

## 2024-12-02 RX ORDER — FENTANYL CITRATE 50 UG/ML
25 INJECTION, SOLUTION INTRAMUSCULAR; INTRAVENOUS EVERY 5 MIN PRN
Status: DISCONTINUED | OUTPATIENT
Start: 2024-12-02 | End: 2024-12-02 | Stop reason: HOSPADM

## 2024-12-02 RX ORDER — DEXMEDETOMIDINE IN 0.9 % NACL 20 MCG/5ML
SYRINGE (ML) INTRAVENOUS AS NEEDED
Status: DISCONTINUED | OUTPATIENT
Start: 2024-12-02 | End: 2024-12-02

## 2024-12-02 RX ORDER — PROPARACAINE HYDROCHLORIDE 5 MG/ML
1 SOLUTION/ DROPS OPHTHALMIC ONCE
Status: COMPLETED | OUTPATIENT
Start: 2024-12-02 | End: 2024-12-02

## 2024-12-02 RX ORDER — OXYCODONE HYDROCHLORIDE 5 MG/1
10 TABLET ORAL EVERY 4 HOURS PRN
Status: DISCONTINUED | OUTPATIENT
Start: 2024-12-02 | End: 2024-12-02 | Stop reason: HOSPADM

## 2024-12-02 RX ORDER — TETRACAINE HYDROCHLORIDE 5 MG/ML
SOLUTION OPHTHALMIC AS NEEDED
Status: DISCONTINUED | OUTPATIENT
Start: 2024-12-02 | End: 2024-12-02 | Stop reason: HOSPADM

## 2024-12-02 RX ORDER — ONDANSETRON HYDROCHLORIDE 2 MG/ML
4 INJECTION, SOLUTION INTRAVENOUS ONCE AS NEEDED
Status: DISCONTINUED | OUTPATIENT
Start: 2024-12-02 | End: 2024-12-02 | Stop reason: HOSPADM

## 2024-12-02 RX ORDER — FENTANYL CITRATE 50 UG/ML
INJECTION, SOLUTION INTRAMUSCULAR; INTRAVENOUS AS NEEDED
Status: DISCONTINUED | OUTPATIENT
Start: 2024-12-02 | End: 2024-12-02

## 2024-12-02 RX ORDER — LIDOCAINE HYDROCHLORIDE 20 MG/ML
INJECTION, SOLUTION INFILTRATION; PERINEURAL AS NEEDED
Status: DISCONTINUED | OUTPATIENT
Start: 2024-12-02 | End: 2024-12-02 | Stop reason: HOSPADM

## 2024-12-02 RX ORDER — TOBRAMYCIN AND DEXAMETHASONE 3; 1 MG/ML; MG/ML
SUSPENSION/ DROPS OPHTHALMIC
Qty: 5 ML | Refills: 0 | Status: SHIPPED | OUTPATIENT
Start: 2024-12-02

## 2024-12-02 RX ORDER — OXYCODONE HYDROCHLORIDE 5 MG/1
5 TABLET ORAL EVERY 4 HOURS PRN
Status: DISCONTINUED | OUTPATIENT
Start: 2024-12-02 | End: 2024-12-02 | Stop reason: HOSPADM

## 2024-12-02 ASSESSMENT — PAIN - FUNCTIONAL ASSESSMENT
PAIN_FUNCTIONAL_ASSESSMENT: 0-10
PAIN_FUNCTIONAL_ASSESSMENT: 0-10

## 2024-12-02 ASSESSMENT — ENCOUNTER SYMPTOMS
FEVER: 0
COUGH: 0
VOMITING: 0
SHORTNESS OF BREATH: 0
ABDOMINAL PAIN: 0

## 2024-12-02 ASSESSMENT — PAIN SCALES - GENERAL
PAINLEVEL_OUTOF10: 0 - NO PAIN
PAINLEVEL_OUTOF10: 0 - NO PAIN

## 2024-12-02 ASSESSMENT — COLUMBIA-SUICIDE SEVERITY RATING SCALE - C-SSRS
2. HAVE YOU ACTUALLY HAD ANY THOUGHTS OF KILLING YOURSELF?: NO
6. HAVE YOU EVER DONE ANYTHING, STARTED TO DO ANYTHING, OR PREPARED TO DO ANYTHING TO END YOUR LIFE?: NO
1. IN THE PAST MONTH, HAVE YOU WISHED YOU WERE DEAD OR WISHED YOU COULD GO TO SLEEP AND NOT WAKE UP?: NO

## 2024-12-02 NOTE — H&P
Ophthalmology Pre-operative History and Physical  Subjective   Patient is a 84 y.o. female presents with history of Vitreous Hemorrhage right eye and macular edema left eye, here for planned eye surgery, of right eye.    Home medications were reviewed. Informed consent for the above surgery was obtained.    Medications Prior to Admission   Medication Sig Dispense Refill Last Dose/Taking    calcium carbonate (Tums Extra Strength) 300 mg (750 mg) chewable tablet Chew 300 mg 3 times a day as needed for indigestion or heartburn.   Past Month    dilTIAZem XR (Dilacor XR) 120 mg 24 hr capsule Take 1 capsule (120 mg) by mouth once daily. 90 capsule 3 12/2/2024 Morning       Review of Systems   Constitutional:  Negative for fever.   Respiratory:  Negative for cough and shortness of breath.    Cardiovascular:  Negative for chest pain.   Gastrointestinal:  Negative for abdominal pain and vomiting.        Objective      No intake/output data recorded.  No intake/output data recorded.  Physical Exam  Constitutional:       General: She is not in acute distress.     Appearance: Normal appearance. She is not ill-appearing.   HENT:      Head: Normocephalic and atraumatic.   Cardiovascular:      Comments: Appears to be perfusing extremities adequately  Pulmonary:      Effort: Pulmonary effort is normal. No respiratory distress.   Abdominal:      General: There is no distension.   Musculoskeletal:         General: Normal range of motion.   Skin:     General: Skin is warm and dry.   Neurological:      General: No focal deficit present.      Mental Status: She is alert.   Psychiatric:         Mood and Affect: Mood normal.         Behavior: Behavior normal.          Assessment/Plan   Active Problems:    Vitreous hemorrhage, right eye (Multi)    Branch retinal vein occlusion of left eye with macular edema      Patient presents for planned eye surgery due to Vitreous hemorrhage of right eye. Will proceed with Pars Plana Vitrectomy right  eye and Intravitreal  Injection of Therapeutic Agent left eye, operative note to follow.

## 2024-12-02 NOTE — DISCHARGE INSTRUCTIONS
Post-operative day 1 appointment on 12/3/24 @ 12pm    Keep shield on until appointment tomorrow.  No eye rubbing  May shower and wash your face but no water inside surgery eye  No lifting any weight above 10lbs    Please start using Tobradex drops 4 times a day in the right eye starting tomorrow.

## 2024-12-02 NOTE — OP NOTE
Vitrectomy Pars Plana, Endolaser (R), Injection Therapeutic Agent Intravitreal (L) Operative Note     Date: 2024  OR Location: [facility] [place] [facility] OR    Name: Wilma Lazcano YOB: 1940, Age: 84 y.o., MRN: [id number], Sex: female    Diagnosis  Pre-op Diagnosis      * Vitreous hemorrhage, right eye (Multi) [H43.11]     * Branch retinal vein occlusion of left eye with macular edema [H34.8320] Post-op Diagnosis     * Vitreous hemorrhage, right eye (Multi) [H43.11]     * Branch retinal vein occlusion of left eye with macular edema [H34.8320]     Procedures  Vitrectomy Pars Plana, Endolaser  86039 - MO VITRECTOMY MECHANICAL PARS PLANA    Injection Therapeutic Agent Intravitreal  93194 - MO INTRAVITREAL NJX PHARMACOLOGIC AGT SPX      Surgeons      * Edward Garza - Primary    Resident/Fellow/Other Assistant:  Surgeons and Role:     * Omar Cage MD - Resident - Assisting    Staff:   Circulator: Bing  Scrub Person: Delia Sarmiento Circulator: Dana  Scrub Person: Cassandra    Anesthesia Staff: Anesthesiologist: Milana Dyer DO  CRNA: JITENDRA Mckinley-CRNA    Procedure Summary  Anesthesia: Monitor Anesthesia Care  ASA: II  Estimated Blood Loss: <5mL  Intra-op Medications:   Administrations occurring from 1130 to 1330 on 24:   Medication Name Total Dose   povidone-iodine 5 % ophthalmic solution 1 Application   bupivacaine PF (Marcaine) injection 0.75 % 3.25 mL   lidocaine (Xylocaine) 20 mg/mL (2 %) injection 3.25 mL   dexmedeTOMidine 4 mcg/mL in NS 5 mL syringe 10 mcg   fentaNYL PF 0.05 mg/mL 50 mcg   propofol (Diprivan) injection 10 mg/mL 10 mg              Anesthesia Record               Intraprocedure I/O Totals       None           Specimen: No specimens collected              Drains and/or Catheters: * None in log *    Tourniquet Times:         ANESTHESIA  Monitored anesthesia care with a standard block consisting of a 1:1 mixture of 4 %  lidocaine and 0.75% Marcaine with Wydase     Findings: Vitreous  Hemorrhage right eye, HRVO right eye    Indications: Wilma Lazcano is an 84 y.o. female who is having surgery for Vitreous hemorrhage, right eye (Multi) [H43.11]  Branch retinal vein occlusion of left eye with macular edema [H34.8305].     The patient was seen in the preoperative area. The risks, benefits, complications, treatment options, non-operative alternatives, expected recovery and outcomes were discussed with the patient. The possibilities of reaction to medication, pulmonary aspiration, injury to surrounding structures, bleeding, recurrent infection, the need for additional procedures, failure to diagnose a condition, and creating a complication requiring transfusion or operation were discussed with the patient. The patient concurred with the proposed plan, giving informed consent.  The site of surgery was properly noted/marked if necessary per policy. The patient has been actively warmed in preoperative area. Preoperative antibiotics are not indicated. Venous thrombosis prophylaxis are not indicated.    Procedure Details:   The patient was brought to the preoperative holding area where the correct eye was confirmed and marked. The patient was then brought to the operating room where a secondary time-out was performed to identify the correct patient, eye, procedure, and any allergies. The patient then underwent intravenous sedation by the anesthesia team followed by a block as described above.  The eye was prepped and draped in the usual sterile ophthalmic fashion followed by placement of a lid speculum.     A 27-gauge trocar was placed in the inferotemporal quadrant 3.5 mm posterior to the limbus after conjunctival displacement.  A 4 mm infusion cannula was placed through this trocar, and the infusion cannula was confirmed in the vitreous cavity prior to turning it on. Two additional 27-gauge trocars were placed in a similar fashion in the superonasal and superotemporal quadrants 3.5 mm posterior to  the limbus after conjunctival displacement.  At this time, a standard three-port pars plana vitrectomy was performed using the light pipe, the cutter, and the BIOM viewing system. A core vitreous dissection was performed. The retina was inspected and was notable for large cup disc ratio, NVD temporally, attenuation of superior blood vessels along with superior ghost vessels. Given these findings, there was high suspicion for an HRVO of the right eye.  A posterior vitreous detachment was performed with aspiration of the hyaloid over the optic nerve after injection of diluted intravitreal triamcinolone.  A thorough peripheral vitreous dissection was performed. The endolaser probe was brought onto the field and was used to apply laser to superior segment of retina. The peripheral retina was inspected with scleral depression and was found to be attached without any retinal holes, breaks or fluid. The superonasal trocar was removed and was sutured with an interrupted 7-0 Vicryl. The infusion cannula and associated trocar were then removed and sutured with an interrupted 7-0 Vicryl. The eye was confirmed to be at a physiologic level by digital palpation after removal of the trocars.   Subconjunctival injection of Tobramycin and Dexamethasone were administered. The lid speculum and drapes were removed. Antibiotic ointment was applied. The eye was patched and shielded.       Attention was then turned to the left eye.  A lid speculum was inserted into the the palbebral fissures to expose the globe & bulbar conjunctiva. The patient was phakic. A site 4.0mm from the limbus was marked with caliper in the inferotemporal quadrant. A drop of dilute povidone-iodine solution was in instilled over the injection site.  A volume of 0.05cc of Avastin was injected into the midvitreous cavity at the marked injection site. The speculum was removed and the ocular surface was flushed to remove any residual povidone-iodone solution.     The  patient tolerated the procedure well without any intraoperative or immediate postoperative complications. The patient was taken to the recovery room in good condition. The patient will follow up in clinic on the following morning.    Complications:  None; patient tolerated the procedure well.    Disposition: PACU - hemodynamically stable.  Condition: stable                 Additional Details: N/A    Attending Attestation:     Edward Garza  Phone Number: [phone]

## 2024-12-02 NOTE — ANESTHESIA PREPROCEDURE EVALUATION
Patient: Wilma Lazcano    Procedure Information       Date/Time: 12/02/24 1130    Procedures:       Vitrectomy Pars Plana (Right)      Injection Therapeutic Agent Intravitreal (Left) - Avastin injection left    Location: [facility] [place] [facility] OR 04 / Virtual [facility] [place] [facility] OR    Surgeons: Edward Garza MD          There were no vitals filed for this visit.    Past Surgical History:   Procedure Laterality Date   • CATARACT EXTRACTION Right    • HYSTERECTOMY     • ORIF WRIST FRACTURE Right      Past Medical History:   Diagnosis Date   • Arthritis    • Atopic dermatitis 04/27/2005    Formatting of this note might be different from the original. Overview: OTHER ATOPIC DERMATITIS 9/15/09 -  This diagnosis record replaces an equivalently named record which has been inactivated via use of a system maintenance tool. Formatting of this note might be different from the original. OTHER ATOPIC DERMATITIS 9/15/09 -  This diagnosis record replaces an equivalently named record which has b   • Dementia     per daughter pt is still very aware and signs own consent   • GERD (gastroesophageal reflux disease)    • Hyperlipidemia    • Hypertension    • Personal history of other specified conditions 10/23/2020    History of vertigo   • Personal history of other specified conditions 10/23/2020    History of palpitations     No current facility-administered medications for this encounter.  Prior to Admission medications    Medication Sig Start Date End Date Taking? Authorizing Provider   calcium carbonate (Tums Extra Strength) 300 mg (750 mg) chewable tablet Chew 300 mg 3 times a day as needed for indigestion or heartburn.   Yes Historical Provider, MD   dilTIAZem XR (Dilacor XR) 120 mg 24 hr capsule Take 1 capsule (120 mg) by mouth once daily. 8/30/24  Yes Shawn Ortiz MD   ezetimibe (Zetia) 10 mg tablet Take 1 tablet (10 mg) by mouth once daily. 8/30/24 11/26/24  Shawn Ortiz MD     Allergies   Allergen Reactions   • Aspirin Other     GI  "and nose bleed, GI and nose bleed   • Atorvastatin Other     Legs cramping   • Cephalexin Hives   • Ciprofloxacin Hives   • Erythromycin Hives   • Latanoprost Other     \"Couldn't see\"   • Lovastatin Hives   • Other Hives and Unknown       EES   • Penicillins Hives   • Simvastatin Other     cramps   • Sulfa (Sulfonamide Antibiotics) Other     03/28/2005;STOMACH PAIN, 03/28/2005;STOMACH PAIN   • Tetanus Vaccines And Toxoid Other     Can't remember reaction   • Tetracyclines Hives   • Tomato Hives and Itching     Tomato paste     Social History     Tobacco Use   • Smoking status: Never   • Smokeless tobacco: Never   • Tobacco comments:     Ambulates freely but slowly. Can lay flat without SOB. No illness last 30 days. Dtr denies any patient or family reaction to anesthesia.    Substance Use Topics   • Alcohol use: Never         Chemistry    Lab Results   Component Value Date/Time     09/18/2023 1156    K 4.3 09/18/2023 1156     09/18/2023 1156    CO2 28 09/18/2023 1156    BUN 13 09/18/2023 1156    CREATININE 1.04 09/18/2023 1156    Lab Results   Component Value Date/Time    CALCIUM 9.9 09/18/2023 1156    ALKPHOS 91 09/18/2023 1156    AST 16 09/18/2023 1156    ALT 13 09/18/2023 1156    BILITOT 0.6 09/18/2023 1156          Lab Results   Component Value Date/Time    WBC 5.2 09/18/2023 1156    HGB 13.9 09/18/2023 1156    HCT 45.9 09/18/2023 1156     09/18/2023 1156     No results found for: \"PROTIME\", \"PTT\", \"INR\"  No results found for this or any previous visit (from the past 4464 hours).  No results found for this or any previous visit from the past 1095 days.      Relevant Problems   Cardiac   (+) Benign essential HTN   (+) Hypercholesterolemia      HEENT   (+) Primary open angle glaucoma (POAG) of both eyes, moderate stage       Clinical information reviewed:   Tobacco  Allergies  Meds   Med Hx  Surg Hx   Fam Hx  Soc Hx        NPO Detail:  NPO/Void Status  Date of Last Liquid: 12/01/24  Time of " Last Liquid: 2000  Date of Last Solid: 12/01/24  Time of Last Solid: 2000         Physical Exam    Airway  Mallampati: II  TM distance: >3 FB  Neck ROM: full     Cardiovascular   Rhythm: regular  Rate: normal     Dental        Pulmonary   Breath sounds clear to auscultation     Abdominal        Anesthesia Plan    History of general anesthesia?: yes  History of complications of general anesthesia?: no    ASA 2     MAC     intravenous induction   Anesthetic plan and risks discussed with patient.

## 2024-12-02 NOTE — ANESTHESIA POSTPROCEDURE EVALUATION
Patient: Wilma Lazcano    Procedure Summary       Date: 12/02/24 [place] [facility] [place] [facility] [place] [facility] [place] [facility] [place]    Anesthesia Start: 1313 Anesthesia Stop: 1443    Procedures:       Vitrectomy Pars Plana, Endolaser (Right: Eye)      Injection Therapeutic Agent Intravitreal (Left: Eye) Diagnosis:       Vitreous hemorrhage, right eye (Multi)      Branch retinal vein occlusion of left eye with macular edema      (Vitreous hemorrhage, right eye (Multi) [H43.11])      (Branch retinal vein occlusion of left eye with macular edema [H34.8320])    Surgeons: Edward Garza MD Responsible Provider: Milana Dyer DO    Anesthesia Type: MAC ASA Status: 2            Anesthesia Type: MAC    Vitals Value Taken Time   /78 12/02/24 1500   Temp 36.2 °C (97.2 °F) 12/02/24 1500   Pulse 78 12/02/24 1500   Resp 16 12/02/24 1500   SpO2 98 % 12/02/24 1500       Anesthesia Post Evaluation    Patient location during evaluation: PACU  Patient participation: complete - patient participated  Level of consciousness: awake  Pain management: satisfactory to patient  Multimodal analgesia pain management approach  Airway patency: patent  Cardiovascular status: acceptable  Respiratory status: acceptable  Hydration status: acceptable  Postoperative Nausea and Vomiting: none    No notable events documented.

## 2024-12-03 ENCOUNTER — OFFICE VISIT (OUTPATIENT)
Dept: OPHTHALMOLOGY | Facility: CLINIC | Age: 84
End: 2024-12-03
Payer: COMMERCIAL

## 2024-12-03 DIAGNOSIS — H43.11 VITREOUS HEMORRHAGE, RIGHT EYE (MULTI): Primary | ICD-10-CM

## 2024-12-03 PROCEDURE — 99024 POSTOP FOLLOW-UP VISIT: CPT | Performed by: OPHTHALMOLOGY

## 2024-12-03 RX ORDER — DORZOLAMIDE HCL 20 MG/ML
1 SOLUTION/ DROPS OPHTHALMIC 2 TIMES DAILY
Qty: 5 ML | Refills: 0 | Status: SHIPPED | OUTPATIENT
Start: 2024-12-03 | End: 2025-12-03

## 2024-12-03 ASSESSMENT — CUP TO DISC RATIO
OS_RATIO: .9
OD_RATIO: POOR VIEW

## 2024-12-03 ASSESSMENT — ENCOUNTER SYMPTOMS
NEUROLOGICAL NEGATIVE: 0
MUSCULOSKELETAL NEGATIVE: 0
PSYCHIATRIC NEGATIVE: 0
ALLERGIC/IMMUNOLOGIC NEGATIVE: 0
RESPIRATORY NEGATIVE: 0
EYES NEGATIVE: 1
GASTROINTESTINAL NEGATIVE: 0
CONSTITUTIONAL NEGATIVE: 0
HEMATOLOGIC/LYMPHATIC NEGATIVE: 0
ENDOCRINE NEGATIVE: 0
CARDIOVASCULAR NEGATIVE: 0

## 2024-12-03 ASSESSMENT — SLIT LAMP EXAM - LIDS
COMMENTS: GOOD POSITION
COMMENTS: GOOD POSITION

## 2024-12-03 ASSESSMENT — CONF VISUAL FIELD
OS_SUPERIOR_NASAL_RESTRICTION: 0
OS_SUPERIOR_TEMPORAL_RESTRICTION: 0
OD_INFERIOR_NASAL_RESTRICTION: 0
OD_INFERIOR_TEMPORAL_RESTRICTION: 0
OD_SUPERIOR_TEMPORAL_RESTRICTION: 0
OD_SUPERIOR_NASAL_RESTRICTION: 0
OS_INFERIOR_NASAL_RESTRICTION: 0
OS_NORMAL: 1
METHOD: COUNTING FINGERS
OD_NORMAL: 1
OS_INFERIOR_TEMPORAL_RESTRICTION: 0

## 2024-12-03 ASSESSMENT — EXTERNAL EXAM - LEFT EYE: OS_EXAM: NORMAL

## 2024-12-03 ASSESSMENT — TONOMETRY
IOP_METHOD: TONOPEN
OD_IOP_MMHG: 27
OS_IOP_MMHG: 14

## 2024-12-03 ASSESSMENT — PAIN SCALES - GENERAL: PAINLEVEL_OUTOF10: 0 - NO PAIN

## 2024-12-03 ASSESSMENT — EXTERNAL EXAM - RIGHT EYE: OD_EXAM: NORMAL

## 2024-12-03 ASSESSMENT — PACHYMETRY
OD_CT(UM): 584
OS_CT(UM): 557

## 2024-12-03 ASSESSMENT — VISUAL ACUITY: METHOD: SNELLEN - LINEAR

## 2024-12-03 NOTE — PROGRESS NOTES
Subjective   Patient ID: Wilma Lazcano is a 84 y.o. female.        HPI    POD pars plana vitrectomy (PPV)/endolaser (EL) OD and JACIEL OS intraoperative with Dr. Garza. Denies problems overnight, was able to sleep alright with patch and shield on.   Last edited by Rene Hastings MD on 12/3/2024 12:01 PM.          Current Outpatient Medications (Ophthalmology pharm classes)   Medication Sig Dispense Refill    dorzolamide (Trusopt) 2 % ophthalmic solution Administer 1 drop into the right eye 2 times a day. 5 mL 0    tobramycin-dexamethasone (Tobradex) ophthalmic suspension Starting 12/3/24 after your appointment. Use 4 times a day in right eye 5 mL 0     Current Outpatient Medications (Other)   Medication Sig Dispense Refill    calcium carbonate (Tums Extra Strength) 300 mg (750 mg) chewable tablet Chew 300 mg 3 times a day as needed for indigestion or heartburn.      dilTIAZem XR (Dilacor XR) 120 mg 24 hr capsule Take 1 capsule (120 mg) by mouth once daily. 90 capsule 3       Objective   Base Eye Exam       Visual Acuity (Snellen - Linear)         Right Left    Near sc 20/200 PH 20/125 20/70 PH 20/50+2              Tonometry (Tonopen, 12:38 PM)         Right Left    Pressure 27 14              Pupils         Dark Light Shape React APD    Right 6 6 Round Nonereactive (pharmacalogically dilated) None by reverse    Left 4 2 Round Brisk None              Visual Fields (Counting fingers)         Left Right     Full Full              Extraocular Movement         Right Left     Full Full              Neuro/Psych       Oriented x3: Yes    Mood/Affect: Normal                  Slit Lamp and Fundus Exam       External Exam         Right Left    External Normal Normal              Slit Lamp Exam         Right Left    Lids/Lashes Good Position Good Position    Conjunctiva/Sclera Inferior MARY JANE, sutures inferior port sites White and quiet    Cornea Suture 11 o/c Clear    Anterior Chamber Trace WBC Deep and quiet    Iris Round and  reactive Round and reactive    Lens iol pco 2+ 1+ NS with ACC not in visual axis    Anterior Vitreous 2-3+ RBCs Clear              Fundus Exam         Right Left    Disc Poor view, appears pink Pink and sharp    C/D Ratio Poor view .9    Macula Flat blunted reflex    Vessels Poor view Normal    Periphery Superior venu with PRP, flat 360 supratemporal retinal hemorrhages in periphery, some apparent collateral vessels                  Imaging    Macula OCT 12/03/24  Right eye (OD): unable  Left eye (OS): central IRF/edema with altered contour, unchanged vs previous    Assessment/Plan     Referral from Dr. Avelar  first seen10/2/24    #Vitreous hemorrhage right eye  -Dense VH right eye - unclear etiology and length of time  -Denies history of diabetes mellitus (DM)  -Reports vision about the same as prior to the fall few months ago, but vision had been significantly worse for about 1 year (reports about 1 year ago lost her  and stopped taking her eye drops for a period) -may have had worsening of glaucoma that limited vision even prior to vitreous hemorrhage    - Now POD1 s/p pars plana vitrectomy (PPV)/endolaser (EL) OD, JACIEL OS. Doing well.  - IOP elevated OD to 27, will start dorzolamide BID OD   - Superior PRP OD looks stable, retina appears flat but poor view d/t residual RBCs posteriorly  - Start tobradex QID OD   - Continue latanoprost qhs OU   - Surgical precautions given, no bending/lifting/heavy straining, no pooled water  - Will schedule POW1, sooner prn with any issues      #Macular edema left eye  #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 Reports no vision changes left eye.   -Edema still present today  -s/p JACIEL OS intraoperatively 12/2/2024    # Primary open angle glaucoma both eyes  -Managed by Dr. Avelar  -intraocular pressure (IOP) improved left eye today to  15    #Pseudophakia (PCIOL) OD / Cataract OS:    -Managed by Dr. Avelar    Plan: One week follow up for POW1

## 2024-12-09 ENCOUNTER — APPOINTMENT (OUTPATIENT)
Dept: OPHTHALMOLOGY | Facility: CLINIC | Age: 84
End: 2024-12-09
Payer: COMMERCIAL

## 2024-12-09 DIAGNOSIS — H34.8320 BRANCH RETINAL VEIN OCCLUSION OF LEFT EYE WITH MACULAR EDEMA: Primary | ICD-10-CM

## 2024-12-09 DIAGNOSIS — H43.11 VITREOUS HEMORRHAGE, RIGHT EYE (MULTI): ICD-10-CM

## 2024-12-09 PROCEDURE — 92134 CPTRZ OPH DX IMG PST SGM RTA: CPT | Performed by: OPHTHALMOLOGY

## 2024-12-09 PROCEDURE — 99024 POSTOP FOLLOW-UP VISIT: CPT | Performed by: OPHTHALMOLOGY

## 2024-12-09 RX ORDER — LATANOPROST 50 UG/ML
1 SOLUTION/ DROPS OPHTHALMIC NIGHTLY
COMMUNITY

## 2024-12-09 ASSESSMENT — ENCOUNTER SYMPTOMS
HEMATOLOGIC/LYMPHATIC NEGATIVE: 0
GASTROINTESTINAL NEGATIVE: 0
NEUROLOGICAL NEGATIVE: 0
RESPIRATORY NEGATIVE: 0
EYES NEGATIVE: 0
MUSCULOSKELETAL NEGATIVE: 0
PSYCHIATRIC NEGATIVE: 0
ALLERGIC/IMMUNOLOGIC NEGATIVE: 0
CONSTITUTIONAL NEGATIVE: 0
CARDIOVASCULAR NEGATIVE: 0
ENDOCRINE NEGATIVE: 0
ROS SKIN COMMENTS: OCT

## 2024-12-09 ASSESSMENT — TONOMETRY
OD_IOP_MMHG: 14
OS_IOP_MMHG: 11
IOP_METHOD: GOLDMANN APPLANATION

## 2024-12-09 ASSESSMENT — VISUAL ACUITY
OS_CC: 20/80
METHOD: SNELLEN - LINEAR
OD_PH_CC: 20/100
CORRECTION_TYPE: GLASSES
OD_CC: 20/125
OS_PH_CC: 20/70

## 2024-12-09 ASSESSMENT — PACHYMETRY
OD_CT(UM): 584
OS_CT(UM): 557

## 2024-12-09 NOTE — PROGRESS NOTES
Subjective   Patient ID: Wilma Lazcano is a 84 y.o. female.      Current Outpatient Medications (Ophthalmology pharm classes)   Medication Sig Dispense Refill    dorzolamide (Trusopt) 2 % ophthalmic solution Administer 1 drop into the right eye 2 times a day. 5 mL 0    tobramycin-dexamethasone (Tobradex) ophthalmic suspension Starting 12/3/24 after your appointment. Use 4 times a day in right eye 5 mL 0     Current Outpatient Medications (Other)   Medication Sig Dispense Refill    calcium carbonate (Tums Extra Strength) 300 mg (750 mg) chewable tablet Chew 300 mg 3 times a day as needed for indigestion or heartburn.      dilTIAZem XR (Dilacor XR) 120 mg 24 hr capsule Take 1 capsule (120 mg) by mouth once daily. 90 capsule 3         Imaging    Macula OCT   Right eye (OD): ERM, punctate intravitrela reflective spots consistent with RBCs  Left eye (OS): normal appearance/contour, IRF resolved    Assessment/Plan     Referral from Dr. Avelar  first seen10/2/24    #Vitreous hemorrhage right eye  -Dense VH right eye - unclear etiology and length of time  -Denies history of diabetes mellitus (DM)  -Reports vision about the same as prior to the fall few months ago, but vision had been significantly worse for about 1 year (reports about 1 year ago lost her  and stopped taking her eye drops for a period) -may have had worsening of glaucoma that limited vision even prior to vitreous hemorrhage  - Now POW1 s/p pars plana vitrectomy (PPV)/endolaser (EL- sectoral PRP) OD, JACIEL OS. Doing well.   -Today VA improved to PH 20/100 vs LP preop  -IOP good  -Taper Tobadex  -Cont Lprost  -Suspect superior BRVO vs BRAO with neovascularization, some areas os possible NVE seen today, view mildly haze secondary to mild K edema  -1 mon Jony for OPTOS and OPTOS FA transit OD     #Macular edema left eye  #Likely twig branch retinal vein occlusion (BRVO) left eye   Left eye with some macular edema, supratemporal retinal hemorrhages,  appearance of likely collateral vessels. Reports no vision changes left eye.   -s/p JACIEL OS intraoperatively 12/2/2024  -Good response to JACIEL with resolution of IRF today  - Plan for possible repeat JACIEL next visit    # Primary open angle glaucoma both eyes  -Managed by Dr. Avelar  -intraocular pressure (IOP) improved left eye today to 15    #Pseudophakia (PCIOL) OD / Cataract OS:    -Managed by Dr. Avelar

## 2024-12-10 ENCOUNTER — APPOINTMENT (OUTPATIENT)
Dept: OPHTHALMOLOGY | Facility: CLINIC | Age: 84
End: 2024-12-10
Payer: COMMERCIAL

## 2024-12-15 ENCOUNTER — PREP FOR PROCEDURE (OUTPATIENT)
Dept: OPHTHALMOLOGY | Facility: HOSPITAL | Age: 84
End: 2024-12-15
Payer: COMMERCIAL

## 2024-12-15 ASSESSMENT — EXTERNAL EXAM - RIGHT EYE: OD_EXAM: NORMAL

## 2024-12-15 ASSESSMENT — SLIT LAMP EXAM - LIDS
COMMENTS: GOOD POSITION
COMMENTS: GOOD POSITION

## 2024-12-15 ASSESSMENT — CUP TO DISC RATIO: OS_RATIO: .9

## 2024-12-15 ASSESSMENT — EXTERNAL EXAM - LEFT EYE: OS_EXAM: NORMAL

## 2025-01-08 ENCOUNTER — APPOINTMENT (OUTPATIENT)
Dept: RADIOLOGY | Facility: HOSPITAL | Age: 85
End: 2025-01-08
Payer: COMMERCIAL

## 2025-01-08 ENCOUNTER — APPOINTMENT (OUTPATIENT)
Dept: ORTHOPEDIC SURGERY | Facility: HOSPITAL | Age: 85
End: 2025-01-08
Payer: COMMERCIAL

## 2025-01-08 DIAGNOSIS — M25.562 ACUTE BILATERAL KNEE PAIN: ICD-10-CM

## 2025-01-08 DIAGNOSIS — M25.561 ACUTE BILATERAL KNEE PAIN: ICD-10-CM

## 2025-01-09 ENCOUNTER — APPOINTMENT (OUTPATIENT)
Dept: OPHTHALMOLOGY | Facility: CLINIC | Age: 85
End: 2025-01-09
Payer: COMMERCIAL

## 2025-02-28 ENCOUNTER — APPOINTMENT (OUTPATIENT)
Dept: PRIMARY CARE | Facility: CLINIC | Age: 85
End: 2025-02-28
Payer: COMMERCIAL

## 2025-02-28 DIAGNOSIS — Z51.81 MEDICATION MONITORING ENCOUNTER: ICD-10-CM

## 2025-02-28 DIAGNOSIS — R73.02 IGT (IMPAIRED GLUCOSE TOLERANCE): ICD-10-CM

## 2025-02-28 DIAGNOSIS — I10 BENIGN ESSENTIAL HTN: Primary | Chronic | ICD-10-CM

## 2025-02-28 DIAGNOSIS — H43.11 VITREOUS HEMORRHAGE, RIGHT EYE (MULTI): ICD-10-CM

## 2025-02-28 DIAGNOSIS — H40.9 GLAUCOMA OF BOTH EYES, UNSPECIFIED GLAUCOMA TYPE: ICD-10-CM

## 2025-02-28 PROCEDURE — 1036F TOBACCO NON-USER: CPT | Performed by: INTERNAL MEDICINE

## 2025-02-28 PROCEDURE — 99213 OFFICE O/P EST LOW 20 MIN: CPT | Performed by: INTERNAL MEDICINE

## 2025-02-28 PROCEDURE — 1159F MED LIST DOCD IN RCRD: CPT | Performed by: INTERNAL MEDICINE

## 2025-02-28 PROCEDURE — 1160F RVW MEDS BY RX/DR IN RCRD: CPT | Performed by: INTERNAL MEDICINE

## 2025-02-28 RX ORDER — LATANOPROST 50 UG/ML
1 SOLUTION/ DROPS OPHTHALMIC NIGHTLY
Qty: 2.5 ML | Refills: 5 | Status: SHIPPED | OUTPATIENT
Start: 2025-02-28

## 2025-02-28 RX ORDER — DILTIAZEM HYDROCHLORIDE 120 MG/1
120 CAPSULE, EXTENDED RELEASE ORAL DAILY
Qty: 90 CAPSULE | Refills: 3 | Status: SHIPPED | OUTPATIENT
Start: 2025-02-28

## 2025-02-28 ASSESSMENT — ENCOUNTER SYMPTOMS
SHORTNESS OF BREATH: 0
COUGH: 0
FEVER: 0
CHILLS: 0
POLYDIPSIA: 0
PALPITATIONS: 0

## 2025-02-28 NOTE — PROGRESS NOTES
Subjective   Patient ID: Wilma Lazcano is a 84 y.o. female who presents for Follow-up.    84-year-old female presents today for routine follow-up.  She has been doing well since her last encounter limited by her bilateral knee osteoarthritis but has serial injections planned with the next one coming up in the near future.  It somewhat limits her ability to walk and exercise but she has been managing okay, the last injection she had done on her single knee was very therapeutically beneficial and tolerated well.  She continues taking her same medications without complication maintains a similar weight to our prior assessment and has no acute concerns at this time.  She is also due for long overdue blood work which I have ordered at this time based on her past medical history and current medications.  The patient has no acute issues at this time we will review her blood work and see her back in 6 months she can contact us sooner on an as-needed basis.         Review of Systems   Constitutional:  Negative for chills and fever.   Respiratory:  Negative for cough and shortness of breath.    Cardiovascular:  Negative for chest pain and palpitations.   Endocrine: Negative for polydipsia and polyuria.       Objective   There were no vitals taken for this visit.    Physical Exam  Constitutional:       Appearance: Normal appearance.   HENT:      Head: Normocephalic and atraumatic.   Eyes:      Extraocular Movements: Extraocular movements intact.      Pupils: Pupils are equal, round, and reactive to light.   Neck:      Thyroid: No thyroid mass or thyromegaly.      Vascular: No carotid bruit.   Cardiovascular:      Rate and Rhythm: Normal rate and regular rhythm.      Heart sounds: No murmur heard.     No friction rub. No gallop.   Pulmonary:      Effort: No respiratory distress.      Breath sounds: No wheezing, rhonchi or rales.   Musculoskeletal:      Cervical back: Neck supple.      Right lower leg: No edema.      Left  lower leg: No edema.   Lymphadenopathy:      Cervical: No cervical adenopathy.   Neurological:      Mental Status: She is alert.       Assessment/Plan   Assessment & Plan  Benign essential HTN    Orders:    dilTIAZem XR (Dilacor XR) 120 mg 24 hr capsule; Take 1 capsule (120 mg) by mouth once daily.    Albumin-Creatinine Ratio, Urine Random; Future    Comprehensive Metabolic Panel; Future    Hemoglobin A1C; Future    CBC and Auto Differential; Future    IGT (impaired glucose tolerance)    Orders:    Albumin-Creatinine Ratio, Urine Random; Future    Comprehensive Metabolic Panel; Future    Hemoglobin A1C; Future    CBC and Auto Differential; Future    Medication monitoring encounter    Orders:    Albumin-Creatinine Ratio, Urine Random; Future    Comprehensive Metabolic Panel; Future    Hemoglobin A1C; Future    CBC and Auto Differential; Future    Vitreous hemorrhage, right eye (Multi)         Glaucoma of both eyes, unspecified glaucoma type    Orders:    latanoprost (Xalatan) 0.005 % ophthalmic solution; Administer 1 drop into both eyes once daily at bedtime.

## 2025-02-28 NOTE — ASSESSMENT & PLAN NOTE
Orders:    dilTIAZem XR (Dilacor XR) 120 mg 24 hr capsule; Take 1 capsule (120 mg) by mouth once daily.    Albumin-Creatinine Ratio, Urine Random; Future    Comprehensive Metabolic Panel; Future    Hemoglobin A1C; Future    CBC and Auto Differential; Future

## 2025-03-01 LAB
ALBUMIN SERPL-MCNC: 4.4 G/DL (ref 3.6–5.1)
ALBUMIN/CREAT UR: 7 MG/G CREAT
ALP SERPL-CCNC: 93 U/L (ref 37–153)
ALT SERPL-CCNC: 9 U/L (ref 6–29)
ANION GAP SERPL CALCULATED.4IONS-SCNC: 14 MMOL/L (CALC) (ref 7–17)
AST SERPL-CCNC: 15 U/L (ref 10–35)
BASOPHILS # BLD AUTO: 57 CELLS/UL (ref 0–200)
BASOPHILS NFR BLD AUTO: 1 %
BILIRUB SERPL-MCNC: 0.4 MG/DL (ref 0.2–1.2)
BUN SERPL-MCNC: 17 MG/DL (ref 7–25)
CALCIUM SERPL-MCNC: 9.7 MG/DL (ref 8.6–10.4)
CHLORIDE SERPL-SCNC: 104 MMOL/L (ref 98–110)
CO2 SERPL-SCNC: 23 MMOL/L (ref 20–32)
CREAT SERPL-MCNC: 1 MG/DL (ref 0.6–0.95)
CREAT UR-MCNC: 27 MG/DL (ref 20–275)
EGFRCR SERPLBLD CKD-EPI 2021: 56 ML/MIN/1.73M2
EOSINOPHIL # BLD AUTO: 268 CELLS/UL (ref 15–500)
EOSINOPHIL NFR BLD AUTO: 4.7 %
ERYTHROCYTE [DISTWIDTH] IN BLOOD BY AUTOMATED COUNT: 14 % (ref 11–15)
EST. AVERAGE GLUCOSE BLD GHB EST-MCNC: 134 MG/DL
EST. AVERAGE GLUCOSE BLD GHB EST-SCNC: 7.4 MMOL/L
GLUCOSE SERPL-MCNC: 101 MG/DL (ref 65–139)
HBA1C MFR BLD: 6.3 % OF TOTAL HGB
HCT VFR BLD AUTO: 43 % (ref 35–45)
HGB BLD-MCNC: 13.5 G/DL (ref 11.7–15.5)
LYMPHOCYTES # BLD AUTO: 1756 CELLS/UL (ref 850–3900)
LYMPHOCYTES NFR BLD AUTO: 30.8 %
MCH RBC QN AUTO: 26.5 PG (ref 27–33)
MCHC RBC AUTO-ENTMCNC: 31.4 G/DL (ref 32–36)
MCV RBC AUTO: 84.5 FL (ref 80–100)
MICROALBUMIN UR-MCNC: 0.2 MG/DL
MONOCYTES # BLD AUTO: 678 CELLS/UL (ref 200–950)
MONOCYTES NFR BLD AUTO: 11.9 %
NEUTROPHILS # BLD AUTO: 2941 CELLS/UL (ref 1500–7800)
NEUTROPHILS NFR BLD AUTO: 51.6 %
PLATELET # BLD AUTO: 315 THOUSAND/UL (ref 140–400)
PMV BLD REES-ECKER: 10.6 FL (ref 7.5–12.5)
POTASSIUM SERPL-SCNC: 4.6 MMOL/L (ref 3.5–5.3)
PROT SERPL-MCNC: 7.4 G/DL (ref 6.1–8.1)
RBC # BLD AUTO: 5.09 MILLION/UL (ref 3.8–5.1)
SODIUM SERPL-SCNC: 141 MMOL/L (ref 135–146)
WBC # BLD AUTO: 5.7 THOUSAND/UL (ref 3.8–10.8)

## 2025-03-02 NOTE — PROGRESS NOTES
Lisa Bee MD   Adult Reconstruction and Joint Replacement Surgery  Phone: [phone]     Fax: [phone]       Name: Wilma Lazcano  Age: 84 y.o.   : 1940   Date of Visit: 3/3/2025    Follow-up Knee    CC: Follow-up RIGHT knee     History of Present Illness:  This patient presents with several months of RIGHT knee pain.     They were last seen for this problem on 2024. At the last visit, the patient underwent steroid injection to the right knee.  The patient already had ongoing physical therapy.  The patient was going to continue Excedrin for pain relief as she had previously tried this and it was working. The patient reports several weeks of relief.  The patient is hoping for repeat steroid injection to the right knee and first time injection to the left knee.  Both knees have severe arthritis in the left knee has begun to bother her more.  She presents again today with her daughter.    Patient has tried the following Ice, NSAIDs, Excedrin, Activity modification, Physical therapy, Corticosteroid injections , and Xray.  Last steroid injection: 2024.  Patient does not have pain at night. Patient is able to walk unlimited blocks. Patient is currently using nothing as assistive device. Primarily complains of lateral  pain. Patient has difficulty with climbing stairs, descending stairs, walking , and walking on unlevel surfaces . The pain is significantly impacting her ability to perform activities of daily living.      HISTORY  PROMs   No questionnaires on file.     Past Medical History:   Diagnosis Date    Arthritis     Atopic dermatitis 2005    Formatting of this note might be different from the original. Overview: OTHER ATOPIC DERMATITIS 9/15/09 -  This diagnosis record replaces an equivalently named record which has been inactivated via use of a system maintenance tool. Formatting of this note might be different from the original. OTHER ATOPIC DERMATITIS 9/15/09 -  This  diagnosis record replaces an equivalently named record which has b    Dementia     per daughter pt is still very aware and signs own consent    GERD (gastroesophageal reflux disease)     Hyperlipidemia     Hypertension     Personal history of other specified conditions 10/23/2020    History of vertigo    Personal history of other specified conditions 10/23/2020    History of palpitations       Past Medical History:   Diagnosis Date    Arthritis     Atopic dermatitis 04/27/2005    Formatting of this note might be different from the original. Overview: OTHER ATOPIC DERMATITIS 9/15/09 -  This diagnosis record replaces an equivalently named record which has been inactivated via use of a system maintenance tool. Formatting of this note might be different from the original. OTHER ATOPIC DERMATITIS 9/15/09 -  This diagnosis record replaces an equivalently named record which has b    Dementia     per daughter pt is still very aware and signs own consent    GERD (gastroesophageal reflux disease)     Hyperlipidemia     Hypertension     Personal history of other specified conditions 10/23/2020    History of vertigo    Personal history of other specified conditions 10/23/2020    History of palpitations     Documented in chart and reviewed.     Past Surgical History:   Procedure Laterality Date    CATARACT EXTRACTION Right     HYSTERECTOMY      ORIF WRIST FRACTURE Right        Allergies: She is allergic to aspirin, atorvastatin, cephalexin, ciprofloxacin, erythromycin, latanoprost, lovastatin, other, penicillins, simvastatin, sulfa (sulfonamide antibiotics), tetanus vaccines and toxoid, tetracyclines, and tomato.     Medications:  Current Outpatient Medications   Medication Instructions    calcium carbonate (TUMS EXTRA STRENGTH) 300 mg, 3 times daily PRN    dilTIAZem XR (DILACOR XR) 120 mg, oral, Daily    latanoprost (Xalatan) 0.005 % ophthalmic solution 1 drop, Both Eyes, Nightly       Family History   Problem Relation Name Age  of Onset    Diabetes Mother      Hypertension Mother      Hypertension Father      Diabetes Sister      Hypertension Sister      Hypertension Brother      Colon cancer Neg Hx      Breast cancer Neg Hx       Documented in chart and reviewed.     Social History     Tobacco Use    Smoking status: Never    Smokeless tobacco: Never    Tobacco comments:     Ambulates freely but slowly. Can lay flat without SOB. No illness last 30 days. Dtr denies any patient or family reaction to anesthesia.    Substance Use Topics    Alcohol use: Never        Review of Systems: Review of systems completed with medical assistant intake. Please refer to this note.     Physical Exam:  BMI: 26.    General: The patient is well-appearing, alert and oriented to time, place and person and has an appropriate affect.      Neurological Examination: Sensation normal, motor function normal, coordination / cerebellum normal, reflexes normal.     Cardiovascular Exam: Capillary refill normal, arterial pulses normal, no varicose veins, no edema.     Skin Exam: Skin throughout the upper and lower extremities is normal without any evidence of infection or rash.     Gait: patient ambulates with an antalgic gait.     Right Hip Examination:  The skin is intact over the hip.     There is no tenderness over the greater trochanter.     Range of motion is full extension to 100 degrees of flexion.     The hip is stable without subluxation or dislocation.     The hip internally rotates to 15 degrees and externally rotates to 45 degrees.     There is no pain with hip motion.     Left Hip Examination:  The skin is intact over the hip.     There is no tenderness over the greater trochanter.     Range of motion is full extension to 100 degrees of flexion.     The hip is stable without subluxation or dislocation.     The hip internally rotates to 15 degrees and externally rotates to 45 degrees.     There is no pain with hip motion.     Left Knee  Examination:  Examination of the left knee reveals the skin to be intact.     There is a moderate effusion in the knee.     The alignment of the knee is Valgus.     This deformity is not correctable.     There is tenderness to palpation over the joint line.     There is significant quadriceps atrophy.     Range of Motion: 15 to 115 degrees of flexion.     The knee is stable to varus-valgus stress and anterior-posterior stress.      There is moderate grinding with range of motion.     There is moderate patellofemoral crepitus.     Right Knee Examination:  Examination of the left knee reveals the skin to be intact.     There is a moderate effusion in the knee.     The alignment of the knee is Valgus.     This deformity is not correctable.     There is tenderness to palpation over the joint line.     There is significant quadriceps atrophy.     Range of Motion: 15 to 110 degrees of flexion.     The knee is stable to varus-valgus stress and anterior-posterior stress.      There is moderate grinding with range of motion.     There is moderate patellofemoral crepitus.    Imaging:  Radiographs were personally reviewed today. There is evidence of severe RIGHT knee osteoarthritis with LATERAL  bone on bone apposition.    Impression and Plan:  This patient is here for follow-up evaluation of RIGHT knee.    DIAGNOSIS  Arthritis of right knee    Primary osteoarthritis of left knee     I have discussed the options in detail with the patient. We have discussed anti-inflammatory medication, activity modification, physical therapy, corticosteroid injections, viscosupplementation injections, partial knee replacement surgery and total knee replacement surgery. Patient is responding well to nonsurgical treatment measures.    The risks and benefits of all these treatment options have been discussed in detail.     Patient has been involved in physical therapy in the past. Patient will continue their home exercise program. Strategies for  pain management using over-the-counter anti-inflammatory medications reviewed.  The patient elects for a steroid injection, which was provided according to procedure note below. Discussed utility of brace. Will defer brace at this time.. Encouraged them to maintain range of motion and strength around the knee joints.  They will continue to implement these strategies in addressing their pain.      Recommend the patient continue optimizing nonsurgical treatment interventions as outlined above for management of their knee arthritis.  I would be happy to see them again at any point to discuss surgery if indicated or they are more optimized or to review progress with nonsurgical treatment of arthritis. The patient verbalizes understanding with the recommendations and treatment plan as outlined above and is in agreement.  Questions were addressed.    RTC: as needed    X-rays at next visit: as needed    Large Joint Injection 33201: Knee  Consent given by: Patient  Timeout: Immediately prior to procedure the correct patient, procedure, and site was verified. Sterile gloves and semi-sterile technique were used.   Indications: Knee pain and joint swelling.   Location: BILATERAL knee  Needle size: 22 G  Approach: Lateral    Medications administered: Please refer to medical assistant note for lot number and expiration date.   Subcutaneous   4 ml of 1% lidocaine     Deep   4 ml of 1% lidocaine   4 mL 0.5% marcaine   2 mL of 40 mg kenalog     Patient tolerance: Dressing applied. Patient tolerated the procedure well with no immediate complications.    L Inj/Asp: bilateral knee on 3/3/2025 7:16 PM  Indications: pain and joint swelling  Details: 22 G needle, lateral approach  Medications (Right): 80 mg triamcinolone acetonide 40 mg/mL; 8 mL lidocaine 10 mg/mL (1 %); 4 mL BUPivacaine HCl 0.5 % (5 mg/mL)  Medications (Left): 80 mg triamcinolone acetonide 40 mg/mL; 8 mg triamcinolone acetonide 40 mg/mL; 8 mL lidocaine 10 mg/mL (1 %); 4  mL BUPivacaine HCl 0.5 % (5 mg/mL)  Outcome: tolerated well, no immediate complications  Procedure, treatment alternatives, risks and benefits explained, specific risks discussed. Consent was given by the patient. Immediately prior to procedure a time out was called to verify the correct patient, procedure, equipment, support staff and site/side marked as required. Patient was prepped and draped in the usual sterile fashion.           _____________________  Lisa Bee MD   Attending Orthopaedic Surgeon  [facility]    [facility]    This office note was transcribed with dictation software.  Please excuse any typographical errors, program misunderstandings leading to inadvertent insertions or deletions of inappropriate wording, pronoun errors and other unintentional transcription errors not noticed on proof-reading.

## 2025-03-03 ENCOUNTER — OFFICE VISIT (OUTPATIENT)
Dept: ORTHOPEDIC SURGERY | Facility: HOSPITAL | Age: 85
End: 2025-03-03
Payer: COMMERCIAL

## 2025-03-03 ENCOUNTER — HOSPITAL ENCOUNTER (OUTPATIENT)
Dept: RADIOLOGY | Facility: HOSPITAL | Age: 85
Discharge: HOME | End: 2025-03-03
Payer: COMMERCIAL

## 2025-03-03 DIAGNOSIS — M25.562 ACUTE BILATERAL KNEE PAIN: ICD-10-CM

## 2025-03-03 DIAGNOSIS — M17.11 ARTHRITIS OF RIGHT KNEE: Primary | ICD-10-CM

## 2025-03-03 DIAGNOSIS — M25.561 ACUTE BILATERAL KNEE PAIN: ICD-10-CM

## 2025-03-03 DIAGNOSIS — M17.12 PRIMARY OSTEOARTHRITIS OF LEFT KNEE: ICD-10-CM

## 2025-03-03 PROCEDURE — 99215 OFFICE O/P EST HI 40 MIN: CPT | Mod: 50,25 | Performed by: STUDENT IN AN ORGANIZED HEALTH CARE EDUCATION/TRAINING PROGRAM

## 2025-03-03 PROCEDURE — 1159F MED LIST DOCD IN RCRD: CPT | Performed by: STUDENT IN AN ORGANIZED HEALTH CARE EDUCATION/TRAINING PROGRAM

## 2025-03-03 PROCEDURE — 99215 OFFICE O/P EST HI 40 MIN: CPT | Performed by: STUDENT IN AN ORGANIZED HEALTH CARE EDUCATION/TRAINING PROGRAM

## 2025-03-03 PROCEDURE — 2500000004 HC RX 250 GENERAL PHARMACY W/ HCPCS (ALT 636 FOR OP/ED): Performed by: STUDENT IN AN ORGANIZED HEALTH CARE EDUCATION/TRAINING PROGRAM

## 2025-03-03 PROCEDURE — 20610 DRAIN/INJ JOINT/BURSA W/O US: CPT | Mod: 50 | Performed by: STUDENT IN AN ORGANIZED HEALTH CARE EDUCATION/TRAINING PROGRAM

## 2025-03-03 PROCEDURE — 73564 X-RAY EXAM KNEE 4 OR MORE: CPT | Mod: 50

## 2025-03-03 RX ADMIN — TRIAMCINOLONE ACETONIDE 80 MG: 40 INJECTION, SUSPENSION INTRA-ARTICULAR; INTRAMUSCULAR at 19:16

## 2025-03-03 RX ADMIN — LIDOCAINE HYDROCHLORIDE 8 ML: 10 INJECTION, SOLUTION INFILTRATION; PERINEURAL at 19:16

## 2025-03-03 RX ADMIN — BUPIVACAINE HYDROCHLORIDE 4 ML: 5 INJECTION, SOLUTION PERINEURAL at 19:16

## 2025-03-03 RX ADMIN — TRIAMCINOLONE ACETONIDE 8 MG: 40 INJECTION, SUSPENSION INTRA-ARTICULAR; INTRAMUSCULAR at 19:16

## 2025-03-05 RX ORDER — BUPIVACAINE HYDROCHLORIDE 5 MG/ML
4 INJECTION, SOLUTION PERINEURAL
Status: COMPLETED | OUTPATIENT
Start: 2025-03-03 | End: 2025-03-03

## 2025-03-05 RX ORDER — LIDOCAINE HYDROCHLORIDE 10 MG/ML
8 INJECTION, SOLUTION INFILTRATION; PERINEURAL
Status: COMPLETED | OUTPATIENT
Start: 2025-03-03 | End: 2025-03-03

## 2025-03-05 RX ORDER — TRIAMCINOLONE ACETONIDE 40 MG/ML
80 INJECTION, SUSPENSION INTRA-ARTICULAR; INTRAMUSCULAR
Status: COMPLETED | OUTPATIENT
Start: 2025-03-03 | End: 2025-03-03

## 2025-03-05 RX ORDER — TRIAMCINOLONE ACETONIDE 40 MG/ML
8 INJECTION, SUSPENSION INTRA-ARTICULAR; INTRAMUSCULAR
Status: COMPLETED | OUTPATIENT
Start: 2025-03-03 | End: 2025-03-03

## 2025-04-09 NOTE — PROGRESS NOTES
"4/11/2025 CC: 85 y.o. presents for glaucoma FU, Dr Torres, Dr Avelar.    Past ocular history: POAG, Pseudophakia OD  Family history: no family history of glaucoma   Past medical history: HTN, memory loss, others per chart   Social history: denies tobacco     Eye medications:   Both eyes: Latanoprost qhs     Allergy:  PCN, cipro, Emycin, tetracycline, ? Sulfa, others per chart     Testing:    HVF 24-2 4/11/2025: OD- IAD>SAD, MD -22.3. OS- [place], MD -29.1 dB    OCT 4/11/2025: OD- full, avg 91. OS- thin I/S, bdl T, avg 54 um. GCA: OS diffuse thin. Mac.: OD- ME, OS- thin, /214    Pachymetry: 584/557    Gonioscopy 4/11/2025: open OU    Tmax: 27/30    Assessment:   1.  Primary open angle glaucoma OU:  /557.  Pt's last visit was with Dr. Torres 3 years ago.  Pt and care giver state that she has not used her drops for \"several years\".  IOP is not controlled OS.  Pt unable to stay for HVF today.  Constricted on confrontational visual fields left eye and significant progression on RNFL testing.  Pt was on Vyzylta and Betimol left eye in the past.  Given compliance issues, will likely have initial Rx be one QD drop + SLT.  Will start with drop first as will have lower risk with subsequent SLT and can also test compliance      Plan:  start latanoprost OU QHS                F/u 1 week (HVF)  - 4/11/2025: IOP 19/15. Testing: severe glaucoma OU, OCT confounded by retina OD. Will continue Latanoprost qhs and start Brimonidine tid (IOP target: mid-teens/low-teens). SLT next.     2. Pseudophakia (PCIOL) OD / Cataract OS:  mildly visually significant OS.  s/p) posterior chamber intraocular lens (PCIOL) 03/2021      Plan:  monitor      3. Vitreous Hemorrhage OD:  Not acute and level of loss of hemoglobin likely 1-2 months ago.  Pt's caregiver states she fell ~6 weeks ago and since then vision acutely went down OD.  Preliminary B-Scan OD today (10/2/24) did not show a retinal detachment.  Etiology unclear.  - s/p " PPV 12/2/2024, Dr Garza    4. 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 POAG, IOP above target    Eye medications:   Both eyes: Continue Latanoprost qhs and start Brimonidine tid    Return in 1 mo, IOP check   RTC Dr Garza

## 2025-04-11 ENCOUNTER — APPOINTMENT (OUTPATIENT)
Dept: OPHTHALMOLOGY | Facility: CLINIC | Age: 85
End: 2025-04-11
Payer: COMMERCIAL

## 2025-04-11 DIAGNOSIS — H40.1133 PRIMARY OPEN ANGLE GLAUCOMA (POAG) OF BOTH EYES, SEVERE STAGE: Primary | ICD-10-CM

## 2025-04-11 DIAGNOSIS — H43.11 VITREOUS HEMORRHAGE, RIGHT EYE (MULTI): ICD-10-CM

## 2025-04-11 DIAGNOSIS — H34.8320 BRANCH RETINAL VEIN OCCLUSION OF LEFT EYE WITH MACULAR EDEMA: ICD-10-CM

## 2025-04-11 PROCEDURE — 92133 CPTRZD OPH DX IMG PST SGM ON: CPT | Performed by: OPHTHALMOLOGY

## 2025-04-11 PROCEDURE — 99214 OFFICE O/P EST MOD 30 MIN: CPT | Performed by: OPHTHALMOLOGY

## 2025-04-11 PROCEDURE — 92083 EXTENDED VISUAL FIELD XM: CPT | Performed by: OPHTHALMOLOGY

## 2025-04-11 PROCEDURE — 92020 GONIOSCOPY: CPT | Performed by: OPHTHALMOLOGY

## 2025-04-11 RX ORDER — BRIMONIDINE TARTRATE 2 MG/ML
1 SOLUTION/ DROPS OPHTHALMIC 3 TIMES DAILY
Qty: 15 ML | Refills: 3 | Status: SHIPPED | OUTPATIENT
Start: 2025-04-11

## 2025-04-11 ASSESSMENT — REFRACTION_MANIFEST
OS_AXIS: 125
OS_SPHERE: +0.25
OD_SPHERE: -0.75
OS_SPHERE: +0.25
OD_CYLINDER: -1.25
OD_CYLINDER: -1.50
OS_ADD: +3.00
OD_ADD: +3.00
OS_CYLINDER: -1.00
OD_AXIS: 150
OS_AXIS: 125
OD_AXIS: 170
OS_CYLINDER: -0.75
OD_SPHERE: -1.00
METHOD_AUTOREFRACTION: 1

## 2025-04-11 ASSESSMENT — TONOMETRY
OD_IOP_MMHG: 19
OS_IOP_MMHG: 15
IOP_METHOD: GOLDMANN APPLANATION

## 2025-04-11 ASSESSMENT — CONF VISUAL FIELD
OS_INFERIOR_TEMPORAL_RESTRICTION: 0
OS_SUPERIOR_NASAL_RESTRICTION: 0
OD_INFERIOR_TEMPORAL_RESTRICTION: 0
OD_INFERIOR_NASAL_RESTRICTION: 0
OD_SUPERIOR_NASAL_RESTRICTION: 0
OS_SUPERIOR_TEMPORAL_RESTRICTION: 0
OD_NORMAL: 1
METHOD: COUNTING FINGERS
OD_SUPERIOR_TEMPORAL_RESTRICTION: 0
OS_INFERIOR_NASAL_RESTRICTION: 0
OS_NORMAL: 1

## 2025-04-11 ASSESSMENT — ENCOUNTER SYMPTOMS
CARDIOVASCULAR NEGATIVE: 0
ENDOCRINE NEGATIVE: 0
RESPIRATORY NEGATIVE: 0
CONSTITUTIONAL NEGATIVE: 0
MUSCULOSKELETAL NEGATIVE: 0
GASTROINTESTINAL NEGATIVE: 0
PSYCHIATRIC NEGATIVE: 0
ALLERGIC/IMMUNOLOGIC NEGATIVE: 0
HEMATOLOGIC/LYMPHATIC NEGATIVE: 0
NEUROLOGICAL NEGATIVE: 0
EYES NEGATIVE: 1

## 2025-04-11 ASSESSMENT — VISUAL ACUITY
CORRECTION_TYPE: GLASSES
OD_CC: 20/30
OS_CC: 20/70
OS_PH_CC: 20/40
METHOD: SNELLEN - LINEAR

## 2025-04-11 ASSESSMENT — GONIOSCOPY
OS_NASAL: PTM
OS_TEMPORAL: PTM
OD_NASAL: PTM
OD_SUPERIOR: PTM
OS_SUPERIOR: PTM
OD_TEMPORAL: PTM
OS_INFERIOR: SS
OD_INFERIOR: SS

## 2025-04-11 ASSESSMENT — CUP TO DISC RATIO
OS_RATIO: .9
OD_RATIO: 0.8

## 2025-04-11 ASSESSMENT — EXTERNAL EXAM - RIGHT EYE: OD_EXAM: NORMAL

## 2025-04-11 ASSESSMENT — SLIT LAMP EXAM - LIDS
COMMENTS: GOOD POSITION
COMMENTS: GOOD POSITION

## 2025-04-11 ASSESSMENT — EXTERNAL EXAM - LEFT EYE: OS_EXAM: NORMAL

## 2025-04-11 ASSESSMENT — PACHYMETRY
OS_CT(UM): 557
OD_CT(UM): 584

## 2025-04-24 ENCOUNTER — APPOINTMENT (OUTPATIENT)
Dept: OPHTHALMOLOGY | Facility: CLINIC | Age: 85
End: 2025-04-24
Payer: COMMERCIAL

## 2025-05-19 ENCOUNTER — APPOINTMENT (OUTPATIENT)
Dept: ORTHOPEDIC SURGERY | Facility: HOSPITAL | Age: 85
End: 2025-05-19
Payer: COMMERCIAL

## 2025-05-19 ENCOUNTER — APPOINTMENT (OUTPATIENT)
Dept: RADIOLOGY | Facility: HOSPITAL | Age: 85
End: 2025-05-19
Payer: COMMERCIAL

## 2025-05-19 DIAGNOSIS — M17.12 PRIMARY OSTEOARTHRITIS OF LEFT KNEE: Primary | ICD-10-CM

## 2025-05-19 DIAGNOSIS — M17.11 ARTHRITIS OF RIGHT KNEE: ICD-10-CM

## 2025-05-19 PROCEDURE — 20610 DRAIN/INJ JOINT/BURSA W/O US: CPT | Mod: RT | Performed by: STUDENT IN AN ORGANIZED HEALTH CARE EDUCATION/TRAINING PROGRAM

## 2025-05-19 PROCEDURE — 2500000004 HC RX 250 GENERAL PHARMACY W/ HCPCS (ALT 636 FOR OP/ED): Performed by: STUDENT IN AN ORGANIZED HEALTH CARE EDUCATION/TRAINING PROGRAM

## 2025-05-19 PROCEDURE — 20610 DRAIN/INJ JOINT/BURSA W/O US: CPT | Mod: LT | Performed by: STUDENT IN AN ORGANIZED HEALTH CARE EDUCATION/TRAINING PROGRAM

## 2025-05-19 RX ADMIN — LIDOCAINE HYDROCHLORIDE 8 ML: 10 INJECTION, SOLUTION INFILTRATION; PERINEURAL at 17:56

## 2025-05-19 RX ADMIN — BUPIVACAINE HYDROCHLORIDE 4 ML: 5 INJECTION, SOLUTION PERINEURAL at 17:57

## 2025-05-19 RX ADMIN — TRIAMCINOLONE ACETONIDE 40 MG: 40 INJECTION, SUSPENSION INTRA-ARTICULAR; INTRAMUSCULAR at 17:56

## 2025-05-19 RX ADMIN — LIDOCAINE HYDROCHLORIDE 8 ML: 10 INJECTION, SOLUTION INFILTRATION; PERINEURAL at 17:57

## 2025-05-19 RX ADMIN — TRIAMCINOLONE ACETONIDE 40 MG: 40 INJECTION, SUSPENSION INTRA-ARTICULAR; INTRAMUSCULAR at 17:57

## 2025-05-19 RX ADMIN — BUPIVACAINE HYDROCHLORIDE 4 ML: 5 INJECTION, SOLUTION PERINEURAL at 17:56

## 2025-05-19 NOTE — PROGRESS NOTES
Lisa Bee MD   Adult Reconstruction and Joint Replacement Surgery  Phone: [phone]     Fax: [phone]       Name: Wilma Lazcano  Age: 85 y.o.   : 1940   Date of Visit: 2025    Follow-up Knee    CC: Follow-up BILATERAL knee     History of Present Illness:  This patient presents with {Numbers; 1-10:67273} {weeks, months, years:39993} of {LEFT RIGHT BILATERAL:12233} knee pain.     They were last seen for this problem on ***. At the last visit, the patient ***. The patient reports ***.    Patient has tried the following {treatments tried:19488}. Date of last steroid injection: ***. Patient {DOES/DOES NOT:69528} have pain at night. The patient {DOES/DOES NOT:93933} report falls related to this problem. Patient is able to walk {Blocks: 09621}. Patient is currently using {assistive device:35022} as assistive device. Primarily complains of {pain location:50471} pain. Patient has difficulty with {activity limitations:74856}. The pain is significantly impacting their ability to perform activities of daily living. Patient reports no longer able to do activities such as ***.     Focused History  *Directly transcribed from patient self-reported intake sheet and [facility] Records.      PMH: {FOCUSEDHX:96010}  PSH: {FOCUSEDSHX:80248}  SHx: {SOCHX:99145}  Jehovah´s Witness: no***  Meds: {MEDSHX:20549}  Allergies: {MEDSHX:03700}  Dental Hx: {DENTAL:88078}  FH: ***No family history of any bleeding or clotting disorders.    HISTORY  ***    Review of Systems: Review of systems completed with medical assistant intake. Please refer to this note.     Physical Exam:  BMI: ***.    General: The patient is well appearing and has an appropriate affect.     Neurological Examination: ***SILT in SPN/DPN/Sural/Saphenous/Tibial nerves. ***5/5 EHL, FHL, Tibial anterior, Gastrocnemius. ***Coordination grossly intact.     Cardiovascular Exam: ***Capillary refill <2 seconds. ***2+ DP. ***No edema. ***No varicose  "veins.     Lymphatic Examination: There is no obvious lymphatic swelling*** present around the involved joint.    Skin Exam: Skin around the pertinent joint is without evidence of infection or ***rash.    Gait: patient ambulates with ***antalgic gait.    Right Hip Examination:  Examination of the hip reveals the skin to be ***intact.     There is no*** tenderness over the greater trochanter.    Range of motion is full extension to 100*** degrees of flexion.    The hip is stable without subluxation or dislocation.    The hip internally rotates to 15*** degrees and externally rotates to 45*** degrees.    There is no*** pain with hip motion.    Left Hip Examination:  Examination of the hip reveals the skin to be ***intact.     There is no*** tenderness over the greater trochanter.    Range of motion is full extension to 100*** degrees of flexion.    The hip is stable without subluxation or dislocation.    The hip internally rotates to 15*** degrees and externally rotates to 45*** degrees.    There is no*** pain with hip motion.    Left Knee Examination:  Examination of the knee reveals the skin to be intact.    There is {:91681::\"no\",\"a small\",\"a large\",\"a moderate\"} effusion in the knee.    The alignment of the knee is {:32821::\"Valgus\",\"neutral\",\"Varus\"}.    This deformity {:06362::\"is not\",\"is\"} correctable.    There is ***tenderness to palpation over the joint line.    There is ***significant quadriceps atrophy.    Range of Motion: {rom3:15033:: \"20\",\"15\",\"10\",\"5\",\"full extension\"} to {rom4:45631:: \"less than 90\",\"90\",\"100\",\"110\",\"120\"} degrees of flexion.    The knee is stable to varus-valgus stress and anterior-posterior stress.     There is {no/mild/moderate/severe:19664} grinding with range of motion.    There is {no/mild/moderate/severe:19664} patellofemoral crepitus.    Right Knee Examination:  Examination of the knee reveals the skin to be intact.    There is {:94464::\"no\",\"a small\",\"a large\",\"a moderate\"} " "effusion in the knee.    The alignment of the knee is {:95373::\"Valgus\",\"neutral\",\"Varus\"}.    This deformity {:82972::\"is not\",\"is\"} correctable.    There is ***tenderness to palpation over the joint line.    There is ***significant quadriceps atrophy.    Range of Motion: {rom3:27222:: \"20\",\"15\",\"10\",\"5\",\"full extension\"} to {rom4:84957:: \"less than 90\",\"90\",\"100\",\"110\",\"120\"} degrees of flexion.    The knee is stable to varus-valgus stress and anterior-posterior stress.     There is {no/mild/moderate/severe:19664} grinding with range of motion.    There is {no/mild/moderate/severe:19664} patellofemoral crepitus.    Imaging:  ***X-rays were personally reviewed today and show implants in good position with no evidence of complication.    ***Radiographs were personally reviewed today. There is evidence of {mild, moderate, severe: 58426} {LEFT RIGHT BILATERAL: 28950} knee osteoarthritis {with or without:00911} {medial, lateral, patellofemoral, tri:87403} bone on bone apposition.    Impression and Plan:  This patient is here for follow-up evaluation of {LEFT RIGHT BILATERAL:88300} knee.    DIAGNOSIS  ***    ***I have discussed the options in detail with the patient. We have discussed anti-inflammatory medication, activity modification, physical therapy, corticosteroid injections, viscosupplementation injections, ***partial knee replacement surgery and total knee replacement surgery. ***Patient is responding well to nonsurgical treatment measures.    ***The risks and benefits of all these treatment options have been discussed in detail.     ***The patient has tried at least 3 months of the above conservative treatments and continues to have disabling pain, impaired activities of daily living and worsened quality of life.  Reviewed the surgical optimization steps to optimize their chances for a successful joint replacement surgery.      ***Currently their BMI is ***.  Discussed that obesity is a risk factor for continued " progression of osteoarthritis. Each pound of weight loss offloads their hip and knee joints by 3-6 pounds.  The most effective of these options is weight loss mainly through restricting caloric intake. ***They would need to lose significant weight before being scheduled for surgery. A referral to a nutritionist was offered***. A referral to bariatric surgery was offered***.     ***A physical therapy prescription was ordered for the patient.  ***Patient will continue their home exercise program. ***Strategies for pain management using over-the-counter anti-inflammatory medications reviewed.  ***The patient was prescribed ***for pain management. ***The patient elects for a steroid injection, which was provided according to procedure note below. Discussed utility of brace. {Brace:89912}. Encouraged them to maintain range of motion and strength around the knee joints.  They will continue to implement these strategies in addressing their pain.      ***Patient was advised to seek further refills for prescription anti-inflammatory medications (e.g. Celebrex, Meloxicam) from their primary care physician as careful monitoring of kidney function, heart function, blood pressure, and other health considerations is important while on these medications.      ***Recommend the patient continue optimizing nonsurgical treatment interventions as outlined above for management of their knee arthritis.  ***I would be happy to see them again at any point to discuss surgery if indicated or they are more optimized or to review progress with nonsurgical treatment of arthritis. The patient verbalizes understanding with the recommendations and treatment plan as outlined above and is in agreement.  Questions were addressed.    RTC: as needed    X-rays at next visit: as needed    Large Joint Injection 78056: Knee  Consent given by: Patient  Timeout: Immediately prior to procedure the correct patient, procedure, and site was verified. Sterile  gloves and semi-sterile technique were used.   Indications: Knee pain and joint swelling.   Location: BILATERAL knee  Needle size: 22 G  Approach: Lateral    Medications administered: Please refer to medical assistant note for lot number and expiration date.   Subcutaneous   4 ml of 1% lidocaine     Deep   4 ml of 1% lidocaine   4 mL 0.5% marcaine   1 mL of 40 mg kenalog     Patient tolerance: Dressing applied. Patient tolerated the procedure well with no immediate complications.    ***    _____________________  Lisa Bee MD   Attending Orthopaedic Surgeon  [facility]    [facility]    This office note was transcribed with dictation software.  Please excuse any typographical errors, program misunderstandings leading to inadvertent insertions or deletions of inappropriate wording, pronoun errors and other unintentional transcription errors not noticed on proof-reading.

## 2025-06-04 ENCOUNTER — APPOINTMENT (OUTPATIENT)
Dept: ORTHOPEDIC SURGERY | Facility: HOSPITAL | Age: 85
End: 2025-06-04
Payer: COMMERCIAL

## 2025-06-04 DIAGNOSIS — M17.0 ARTHRITIS OF BOTH KNEES: Primary | ICD-10-CM

## 2025-06-04 PROCEDURE — 2500000004 HC RX 250 GENERAL PHARMACY W/ HCPCS (ALT 636 FOR OP/ED): Performed by: STUDENT IN AN ORGANIZED HEALTH CARE EDUCATION/TRAINING PROGRAM

## 2025-06-04 PROCEDURE — 20610 DRAIN/INJ JOINT/BURSA W/O US: CPT | Mod: LT | Performed by: STUDENT IN AN ORGANIZED HEALTH CARE EDUCATION/TRAINING PROGRAM

## 2025-06-04 PROCEDURE — 20610 DRAIN/INJ JOINT/BURSA W/O US: CPT | Mod: RT | Performed by: STUDENT IN AN ORGANIZED HEALTH CARE EDUCATION/TRAINING PROGRAM

## 2025-06-04 PROCEDURE — 99213 OFFICE O/P EST LOW 20 MIN: CPT | Performed by: STUDENT IN AN ORGANIZED HEALTH CARE EDUCATION/TRAINING PROGRAM

## 2025-06-04 PROCEDURE — 99215 OFFICE O/P EST HI 40 MIN: CPT | Performed by: STUDENT IN AN ORGANIZED HEALTH CARE EDUCATION/TRAINING PROGRAM

## 2025-06-04 NOTE — PROGRESS NOTES
Lisa Bee MD   Adult Reconstruction and Joint Replacement Surgery  Phone: [phone]     Fax: [phone]       Name: Wilma Lazcano  Age: 85 y.o.   : 1940   Date of Visit: 2025    Follow-up Knee    CC: Follow-up BILATERAL knee     History of Present Illness:  This patient presents with several months of RIGHT knee pain.     They were last seen for this problem on 3/3/2025. At the last visit, the patient underwent steroid injection of bilateral knee. The patient reports 60% relief for about 6 weeks.  She hopes to repeat bilateral injections today.  She is here today again with her daughter.  Patient continues to have memory issues.     Patient has tried the following Ice, NSAIDs, Excedrin, Activity modification, Physical therapy, Corticosteroid injections , and Xray.  Last steroid injection: 2024.  Patient does not have pain at night. Patient is able to walk unlimited blocks. Patient is currently using nothing as assistive device. Primarily complains of lateral  pain. Patient has difficulty with climbing stairs, descending stairs, walking , and walking on unlevel surfaces . The pain is significantly impacting her ability to perform activities of daily living.      HISTORY  PROMs   No questionnaires on file.     Medical History[1]    Medical History[2]  Documented in chart and reviewed.     Surgical History[3]    Allergies: She is allergic to aspirin, atorvastatin, cephalexin, ciprofloxacin, erythromycin, latanoprost, lovastatin, other, penicillins, simvastatin, sulfa (sulfonamide antibiotics), tetanus vaccines and toxoid, tetracyclines, and tomato.     Medications:  Current Outpatient Medications   Medication Instructions    brimonidine (AlphaGAN) 0.2 % ophthalmic solution 1 drop, Both Eyes, 3 times daily    calcium carbonate (TUMS EXTRA STRENGTH) 300 mg, 3 times daily PRN    dilTIAZem XR (DILACOR XR) 120 mg, oral, Daily    latanoprost (Xalatan) 0.005 % ophthalmic solution 1 drop,  Both Eyes, Nightly       Family History[4]  Documented in chart and reviewed.     Social History     Tobacco Use    Smoking status: Never    Smokeless tobacco: Never    Tobacco comments:     Ambulates freely but slowly. Can lay flat without SOB. No illness last 30 days. Dtr denies any patient or family reaction to anesthesia.    Substance Use Topics    Alcohol use: Never        Review of Systems: Review of systems completed with medical assistant intake. Please refer to this note.     Physical Exam:  BMI: 26.    General: The patient is well-appearing, alert and oriented to time, place and person and has an appropriate affect.      Neurological Examination: Sensation normal, motor function normal, coordination / cerebellum normal, reflexes normal.     Cardiovascular Exam: Capillary refill normal, arterial pulses normal, no varicose veins, no edema.     Skin Exam: Skin throughout the upper and lower extremities is normal without any evidence of infection or rash.     Gait: patient ambulates with an antalgic gait.     Right Hip Examination:  The skin is intact over the hip.     There is no tenderness over the greater trochanter.     Range of motion is full extension to 100 degrees of flexion.     The hip is stable without subluxation or dislocation.     The hip internally rotates to 15 degrees and externally rotates to 45 degrees.     There is no pain with hip motion.     Left Hip Examination:  The skin is intact over the hip.     There is no tenderness over the greater trochanter.     Range of motion is full extension to 100 degrees of flexion.     The hip is stable without subluxation or dislocation.     The hip internally rotates to 15 degrees and externally rotates to 45 degrees.     There is no pain with hip motion.     Left Knee Examination:  Examination of the left knee reveals the skin to be intact.     There is a moderate effusion in the knee.     The alignment of the knee is Valgus.     This deformity is not  correctable.     There is tenderness to palpation over the joint line.     There is significant quadriceps atrophy.     Range of Motion: 15 to 115 degrees of flexion.     The knee is stable to varus-valgus stress and anterior-posterior stress.      There is moderate grinding with range of motion.     There is moderate patellofemoral crepitus.     Right Knee Examination:  Examination of the left knee reveals the skin to be intact.     There is a moderate effusion in the knee.     The alignment of the knee is Valgus.     This deformity is not correctable.     There is tenderness to palpation over the joint line.     There is significant quadriceps atrophy.     Range of Motion: 15 to 110 degrees of flexion.     The knee is stable to varus-valgus stress and anterior-posterior stress.      There is moderate grinding with range of motion.     There is moderate patellofemoral crepitus.    Imaging:    Radiographs were personally reviewed today. There is evidence of severe BILATERAL knee osteoarthritis with LATERAL  bone on bone apposition.    Impression and Plan:  This patient is here for follow-up evaluation of BILATERAL knee.    DIAGNOSIS  Arthritis of both knees     I have discussed the options in detail with the patient. We have discussed anti-inflammatory medication, activity modification, physical therapy, corticosteroid injections, viscosupplementation injections, partial knee replacement surgery and total knee replacement surgery. Patient is responding well to nonsurgical treatment measures.    The risks and benefits of all these treatment options have been discussed in detail.     The patient has tried at least 3 months of the above conservative treatments and continues to have disabling pain, impaired activities of daily living and worsened quality of life.  Reviewed the surgical optimization steps to optimize their chances for a successful joint replacement surgery.      Patient is not interested in further  physical therapy.  Patient will continue their home exercise program. Strategies for pain management using over-the-counter anti-inflammatory medications reviewed.  The patient elects for a steroid injection, which was provided according to procedure note below. Discussed utility of brace. Will defer brace at this time.. Encouraged them to maintain range of motion and strength around the knee joints.  They will continue to implement these strategies in addressing their pain.      Recommend the patient continue optimizing nonsurgical treatment interventions as outlined above for management of their knee arthritis.  I would be happy to see them again at any point to discuss surgery if indicated or they are more optimized or to review progress with nonsurgical treatment of arthritis. The patient verbalizes understanding with the recommendations and treatment plan as outlined above and is in agreement.  Questions were addressed.    RTC: as needed    X-rays at next visit: as needed    Large Joint Injection 20610: Knee  Consent given by: Patient  Timeout: Immediately prior to procedure the correct patient, procedure, and site was verified. Sterile gloves and semi-sterile technique were used.   Indications: Knee pain and joint swelling.   Location: BILATERAL knee  Needle size: 22 G  Approach: Lateral    Medications administered: Please refer to medical assistant note for lot number and expiration date.   Subcutaneous   4 ml of 1% lidocaine     Deep   4 ml of 1% lidocaine   4 mL 0.5% marcaine   1 mL of 40 mg kenalog     Patient tolerance: Dressing applied. Patient tolerated the procedure well with no immediate complications.    L Inj/Asp: R knee on 5/19/2025 5:56 PM  Indications: pain and joint swelling  Details: 22 G needle, lateral approach  Medications: 40 mg triamcinolone acetonide 40 mg/mL; 8 mL lidocaine 10 mg/mL (1 %); 4 mL BUPivacaine HCl 0.5 % (5 mg/mL)  Outcome: tolerated well, no immediate  complications  Procedure, treatment alternatives, risks and benefits explained, specific risks discussed. Consent was given by the patient. Immediately prior to procedure a time out was called to verify the correct patient, procedure, equipment, support staff and site/side marked as required. Patient was prepped and draped in the usual sterile fashion.       L Inj/Asp: L knee on 5/19/2025 5:57 PM  Indications: pain and joint swelling  Details: 22 G needle, lateral approach  Medications: 40 mg triamcinolone acetonide 40 mg/mL; 8 mL lidocaine 10 mg/mL (1 %); 4 mL BUPivacaine HCl 0.5 % (5 mg/mL)  Outcome: tolerated well, no immediate complications  Procedure, treatment alternatives, risks and benefits explained, specific risks discussed. Consent was given by the patient. Immediately prior to procedure a time out was called to verify the correct patient, procedure, equipment, support staff and site/side marked as required. Patient was prepped and draped in the usual sterile fashion.             _____________________  Lisa Bee MD   Attending Orthopaedic Surgeon  [facility]    [facility]    This office note was transcribed with dictation software.  Please excuse any typographical errors, program misunderstandings leading to inadvertent insertions or deletions of inappropriate wording, pronoun errors and other unintentional transcription errors not noticed on proof-reading.           [1]   Past Medical History:  Diagnosis Date    Arthritis     Atopic dermatitis 04/27/2005    Formatting of this note might be different from the original. Overview: OTHER ATOPIC DERMATITIS 9/15/09 -  This diagnosis record replaces an equivalently named record which has been inactivated via use of a system maintenance tool. Formatting of this note might be different from the original. OTHER ATOPIC DERMATITIS 9/15/09 -  This diagnosis record replaces an equivalently named record which  has b    Dementia     per daughter pt is still very aware and signs own consent    GERD (gastroesophageal reflux disease)     Hyperlipidemia     Hypertension     Personal history of other specified conditions 10/23/2020    History of vertigo    Personal history of other specified conditions 10/23/2020    History of palpitations   [2]   Past Medical History:  Diagnosis Date    Arthritis     Atopic dermatitis 04/27/2005    Formatting of this note might be different from the original. Overview: OTHER ATOPIC DERMATITIS 9/15/09 -  This diagnosis record replaces an equivalently named record which has been inactivated via use of a system maintenance tool. Formatting of this note might be different from the original. OTHER ATOPIC DERMATITIS 9/15/09 -  This diagnosis record replaces an equivalently named record which has b    Dementia     per daughter pt is still very aware and signs own consent    GERD (gastroesophageal reflux disease)     Hyperlipidemia     Hypertension     Personal history of other specified conditions 10/23/2020    History of vertigo    Personal history of other specified conditions 10/23/2020    History of palpitations   [3]   Past Surgical History:  Procedure Laterality Date    CATARACT EXTRACTION Right     HYSTERECTOMY      ORIF WRIST FRACTURE Right    [4]   Family History  Problem Relation Name Age of Onset    Diabetes Mother      Hypertension Mother      Hypertension Father      Diabetes Sister      Hypertension Sister      Hypertension Brother      Colon cancer Neg Hx      Breast cancer Neg Hx

## 2025-06-12 ENCOUNTER — APPOINTMENT (OUTPATIENT)
Dept: OPHTHALMOLOGY | Facility: CLINIC | Age: 85
End: 2025-06-12
Payer: COMMERCIAL

## 2025-06-12 DIAGNOSIS — H43.11 VITREOUS HEMORRHAGE, RIGHT EYE (MULTI): Primary | ICD-10-CM

## 2025-06-12 DIAGNOSIS — H34.8320 BRANCH RETINAL VEIN OCCLUSION OF LEFT EYE WITH MACULAR EDEMA: ICD-10-CM

## 2025-06-12 DIAGNOSIS — H40.9 GLAUCOMA OF BOTH EYES, UNSPECIFIED GLAUCOMA TYPE: ICD-10-CM

## 2025-06-12 PROCEDURE — 99213 OFFICE O/P EST LOW 20 MIN: CPT | Performed by: OPHTHALMOLOGY

## 2025-06-12 PROCEDURE — 92134 CPTRZ OPH DX IMG PST SGM RTA: CPT | Mod: BILATERAL PROCEDURE | Performed by: OPHTHALMOLOGY

## 2025-06-12 RX ORDER — LATANOPROST 50 UG/ML
1 SOLUTION/ DROPS OPHTHALMIC NIGHTLY
Qty: 2.5 ML | Refills: 5 | Status: SHIPPED | OUTPATIENT
Start: 2025-06-12

## 2025-06-12 ASSESSMENT — PACHYMETRY
OD_CT(UM): 584
OS_CT(UM): 557

## 2025-06-12 ASSESSMENT — ENCOUNTER SYMPTOMS
MUSCULOSKELETAL NEGATIVE: 0
CONSTITUTIONAL NEGATIVE: 0
RESPIRATORY NEGATIVE: 0
ALLERGIC/IMMUNOLOGIC NEGATIVE: 0
ENDOCRINE NEGATIVE: 0
GASTROINTESTINAL NEGATIVE: 0
CARDIOVASCULAR NEGATIVE: 0
NEUROLOGICAL NEGATIVE: 0
EYES NEGATIVE: 1
HEMATOLOGIC/LYMPHATIC NEGATIVE: 0
PSYCHIATRIC NEGATIVE: 0

## 2025-06-12 ASSESSMENT — VISUAL ACUITY
OD_CC: 20/400
OS_PH_CC+: -2
OS_CC: 20/70
METHOD: SNELLEN - LINEAR
CORRECTION_TYPE: GLASSES
OS_CC+: -2
OS_PH_CC: 20/40

## 2025-06-12 ASSESSMENT — SLIT LAMP EXAM - LIDS
COMMENTS: GOOD POSITION
COMMENTS: GOOD POSITION

## 2025-06-12 ASSESSMENT — EXTERNAL EXAM - RIGHT EYE: OD_EXAM: NORMAL

## 2025-06-12 ASSESSMENT — TONOMETRY
OS_IOP_MMHG: 18
OD_IOP_MMHG: 19
IOP_METHOD: GOLDMANN APPLANATION

## 2025-06-12 ASSESSMENT — CUP TO DISC RATIO
OS_RATIO: .9
OD_RATIO: 0.8

## 2025-06-12 ASSESSMENT — EXTERNAL EXAM - LEFT EYE: OS_EXAM: NORMAL

## 2025-06-12 NOTE — PROGRESS NOTES
Subjective   Patient ID: Wilma Lazcano is a 85 y.o. female.      Current Outpatient Medications (Ophthalmology pharm classes)   Medication Sig Dispense Refill    brimonidine (AlphaGAN) 0.2 % ophthalmic solution Administer 1 drop into both eyes 3 times a day. 15 mL 3    latanoprost (Xalatan) 0.005 % ophthalmic solution Administer 1 drop into both eyes once daily at bedtime. 2.5 mL 5     Current Outpatient Medications (Other)   Medication Sig Dispense Refill    calcium carbonate (Tums Extra Strength) 300 mg (750 mg) chewable tablet Chew 300 mg 3 times a day as needed for indigestion or heartburn.      dilTIAZem XR (Dilacor XR) 120 mg 24 hr capsule Take 1 capsule (120 mg) by mouth once daily. 90 capsule 3         Imaging    Macula OCT   Right eye (OD): Trace IRF, good contour  Left eye (OS): normal appearance/contour, IRF resolved    Assessment/Plan     Referral from Dr. Avelar  first seen10/2/24  Seen 12/9/24, lost to follow up    #Vitreous hemorrhage right eye  -Initially seen 10/2/24, at that visit:  -Dense VH right eye - unclear etiology and length of time  -Denies history of diabetes mellitus (DM)  -Reported vision about the same as prior to the fall few months ago, but vision had been significantly worse for about 1 year (reports about 1 year ago lost her  and stopped taking her eye drops for a period) -may have had worsening of glaucoma that limited vision even prior to vitreous hemorrhage  -Subsequently underwent PPV/EL/ OD and JACIEL OS 12/2/24  -At POW 1 visit [facility] had improved to 20/100 OD vs LP preop  -Was then lost to follow up  -Was seen Apr 2025 by Dr. Irwin, when she noted decrease in vision OD since fall 6 weeks prior. 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 Reports no vision changes left eye.   -s/p JACIEL OS intraoperatively 12/2/2024  -Macula edema continues to remain resolved OS today    # Primary open angle glaucoma both eyes  -Managed by Dr. Avelar    #Pseudophakia (PCIOL) OD / Cataract OS:    -Managed by Dr. Avelar

## 2025-06-17 RX ORDER — TRIAMCINOLONE ACETONIDE 40 MG/ML
40 INJECTION, SUSPENSION INTRA-ARTICULAR; INTRAMUSCULAR
Status: COMPLETED | OUTPATIENT
Start: 2025-05-19 | End: 2025-05-19

## 2025-06-17 RX ORDER — BUPIVACAINE HYDROCHLORIDE 5 MG/ML
4 INJECTION, SOLUTION PERINEURAL
Status: COMPLETED | OUTPATIENT
Start: 2025-05-19 | End: 2025-05-19

## 2025-06-17 RX ORDER — LIDOCAINE HYDROCHLORIDE 10 MG/ML
8 INJECTION, SOLUTION INFILTRATION; PERINEURAL
Status: COMPLETED | OUTPATIENT
Start: 2025-05-19 | End: 2025-05-19

## 2025-07-25 ENCOUNTER — APPOINTMENT (OUTPATIENT)
Dept: OPHTHALMOLOGY | Facility: CLINIC | Age: 85
End: 2025-07-25
Payer: COMMERCIAL

## 2025-07-30 DIAGNOSIS — H40.1133 PRIMARY OPEN ANGLE GLAUCOMA (POAG) OF BOTH EYES, SEVERE STAGE: ICD-10-CM

## 2025-07-30 DIAGNOSIS — H40.9 GLAUCOMA OF BOTH EYES, UNSPECIFIED GLAUCOMA TYPE: ICD-10-CM

## 2025-08-02 ENCOUNTER — PATIENT MESSAGE (OUTPATIENT)
Dept: OPHTHALMOLOGY | Facility: CLINIC | Age: 85
End: 2025-08-02
Payer: COMMERCIAL

## 2025-08-02 DIAGNOSIS — H40.1133 PRIMARY OPEN ANGLE GLAUCOMA (POAG) OF BOTH EYES, SEVERE STAGE: ICD-10-CM

## 2025-08-02 DIAGNOSIS — H40.9 GLAUCOMA OF BOTH EYES, UNSPECIFIED GLAUCOMA TYPE: ICD-10-CM

## 2025-08-04 RX ORDER — LATANOPROST 50 UG/ML
1 SOLUTION/ DROPS OPHTHALMIC NIGHTLY
Qty: 2.5 ML | Refills: 5 | Status: SHIPPED | OUTPATIENT
Start: 2025-08-04

## 2025-08-04 RX ORDER — BRIMONIDINE TARTRATE 2 MG/ML
SOLUTION/ DROPS OPHTHALMIC
Refills: 0 | OUTPATIENT
Start: 2025-08-04

## 2025-08-04 RX ORDER — LATANOPROST 50 UG/ML
SOLUTION/ DROPS OPHTHALMIC
Refills: 0 | OUTPATIENT
Start: 2025-08-04

## 2025-08-04 RX ORDER — BRIMONIDINE TARTRATE 2 MG/ML
1 SOLUTION/ DROPS OPHTHALMIC 3 TIMES DAILY
Qty: 15 ML | Refills: 3 | Status: SHIPPED | OUTPATIENT
Start: 2025-08-04

## 2025-08-08 ENCOUNTER — APPOINTMENT (OUTPATIENT)
Dept: OPHTHALMOLOGY | Facility: CLINIC | Age: 85
End: 2025-08-08
Payer: COMMERCIAL

## 2025-08-08 DIAGNOSIS — H35.352 CYSTOID MACULAR EDEMA OF LEFT EYE: ICD-10-CM

## 2025-08-08 DIAGNOSIS — H43.12 VITREOUS HEMORRHAGE OF LEFT EYE (MULTI): ICD-10-CM

## 2025-08-08 DIAGNOSIS — H34.8320 BRANCH RETINAL VEIN OCCLUSION OF LEFT EYE WITH MACULAR EDEMA: ICD-10-CM

## 2025-08-08 DIAGNOSIS — H43.11 VITREOUS HEMORRHAGE, RIGHT EYE (MULTI): Primary | ICD-10-CM

## 2025-08-08 RX ORDER — TETRACAINE HYDROCHLORIDE 5 MG/ML
1 SOLUTION OPHTHALMIC ONCE
OUTPATIENT
Start: 2025-08-08 | End: 2025-08-08

## 2025-08-08 RX ORDER — PROPARACAINE HYDROCHLORIDE 5 MG/ML
1 SOLUTION/ DROPS OPHTHALMIC ONCE
OUTPATIENT
Start: 2025-08-08 | End: 2025-08-08

## 2025-08-08 RX ORDER — TROPICAMIDE 10 MG/ML
1 SOLUTION/ DROPS OPHTHALMIC
OUTPATIENT
Start: 2025-08-08 | End: 2025-08-08

## 2025-08-08 RX ORDER — PHENYLEPHRINE HYDROCHLORIDE 25 MG/ML
1 SOLUTION/ DROPS OPHTHALMIC
OUTPATIENT
Start: 2025-08-08 | End: 2025-08-08

## 2025-08-08 ASSESSMENT — VISUAL ACUITY
OS_PH_SC: 20/40-2
METHOD: SNELLEN - LINEAR
OS_SC: 20/60-2+2
OD_SC: HM@1'

## 2025-08-08 ASSESSMENT — PACHYMETRY
OS_CT(UM): 557
OD_CT(UM): 584

## 2025-08-08 ASSESSMENT — CONF VISUAL FIELD
OS_INFERIOR_TEMPORAL_RESTRICTION: 0
OD_SUPERIOR_TEMPORAL_RESTRICTION: 1
OS_INFERIOR_NASAL_RESTRICTION: 0
OS_SUPERIOR_TEMPORAL_RESTRICTION: 0
OD_SUPERIOR_NASAL_RESTRICTION: 1
OD_INFERIOR_TEMPORAL_RESTRICTION: 1
OS_SUPERIOR_NASAL_RESTRICTION: 0
OS_NORMAL: 1
OD_INFERIOR_NASAL_RESTRICTION: 1

## 2025-08-08 ASSESSMENT — ENCOUNTER SYMPTOMS
ALLERGIC/IMMUNOLOGIC NEGATIVE: 0
HEMATOLOGIC/LYMPHATIC NEGATIVE: 0
ENDOCRINE NEGATIVE: 1
PSYCHIATRIC NEGATIVE: 0
RESPIRATORY NEGATIVE: 0
NEUROLOGICAL NEGATIVE: 0
GASTROINTESTINAL NEGATIVE: 0
EYES NEGATIVE: 1
CONSTITUTIONAL NEGATIVE: 0
CARDIOVASCULAR NEGATIVE: 0
MUSCULOSKELETAL NEGATIVE: 0

## 2025-08-08 ASSESSMENT — CUP TO DISC RATIO
OD_RATIO: 0.8
OS_RATIO: .9

## 2025-08-08 ASSESSMENT — TONOMETRY
IOP_METHOD: GOLDMANN APPLANATION
OD_IOP_MMHG: 22
OS_IOP_MMHG: 21

## 2025-08-08 ASSESSMENT — SLIT LAMP EXAM - LIDS
COMMENTS: GOOD POSITION
COMMENTS: GOOD POSITION

## 2025-08-08 ASSESSMENT — EXTERNAL EXAM - RIGHT EYE: OD_EXAM: NORMAL

## 2025-08-08 ASSESSMENT — EXTERNAL EXAM - LEFT EYE: OS_EXAM: NORMAL

## 2025-08-08 NOTE — PROGRESS NOTES
HISTORY    Subjective   Patient ID: Wilma Lazcano is a 85 y.o. female.      Current Outpatient Medications (Ophthalmology pharm classes)   Medication Sig Dispense Refill    brimonidine (AlphaGAN) 0.2 % ophthalmic solution Administer 1 drop into both eyes 3 times a day. 15 mL 3    latanoprost (Xalatan) 0.005 % ophthalmic solution Administer 1 drop into both eyes once daily at bedtime. 2.5 mL 5     Current Outpatient Medications (Other)   Medication Sig Dispense Refill    calcium carbonate (Tums Extra Strength) 300 mg (750 mg) chewable tablet Chew 300 mg 3 times a day as needed for indigestion or heartburn.      dilTIAZem XR (Dilacor XR) 120 mg 24 hr capsule Take 1 capsule (120 mg) by mouth once daily. 90 capsule 3         Imaging    Macula OCT   Right eye (OD): Trace IRF, good contour  Left eye (OS): normal appearance/contour, IRF resolved    HISTORY    Referral from Dr. Avelar  first seen10/2/24  Seen 12/9/24, lost to follow up    #Vitreous hemorrhage right eye  -Initially seen 10/2/24, at that visit:  -Dense VH right eye - unclear etiology and length of time  -Denies history of diabetes mellitus (DM)  -Reported vision about the same as prior to the fall few months ago, but vision had been significantly worse for about 1 year (reports about 1 year ago lost her  and stopped taking her eye drops for a period) -may have had worsening of glaucoma that limited vision even prior to vitreous hemorrhage  -Subsequently underwent PPV/EL/ OD and JACIEL OS 12/2/24  -At POW 1 visit [facility] had improved to 20/100 OD vs LP preop  -Was then lost to follow up  -Was seen Apr 2025 by Dr. Irwin, when she noted decrease in vision OD since fall 6 weeks prior. Had recurrent VH at that visit with attached retina, but good BCVA  -Today, BCVA down to 20/400, has dense VH with difficulty view but grossly attached with previous laser noted  -Relatively clear macula OCT with some trace edema but good foveal contour  -Discussed injection  today, patient does not want today, will defer but monitor closely  -Activity restricitons, head of bed elevated  -Fup 2-3 weeks, if not improving may need to consider repeat PPV/EL vs antiVEGF  Please obtain insuracne approval for antiVEGF treatment right eye for BRVO and VH    #Macular edema left eye  # branch retinal vein occlusion (BRVO) left eye   Left eye at initial visit with some macular edema, supratemporal retinal hemorrhages, appearance of likely collateral vessels. Reports no vision changes left eye.   -s/p JACIEL OS intraoperatively 12/2/2024  -Macula edema continues to remain resolved OS today    # Primary open angle glaucoma both eyes  -Managed by Dr. Avelar            TODAY    Assessment/Plan   Diagnoses and all orders for this visit:  Vitreous hemorrhage, right eye (Multi)  -     OCT, Retina - OU - Both Eyes  -     Color Fundus Photography - OU - Both Eyes  Branch retinal vein occlusion of left eye with macular edema  -     OCT, Retina - OU - Both Eyes  -     Color Fundus Photography - OU - Both Eyes  Vitreous hemorrhage of left eye (Multi)  -     OCT, Retina - OU - Both Eyes  -     Color Fundus Photography - OU - Both Eyes  Cystoid macular edema of left eye  -     OCT, Retina - OU - Both Eyes  -     Color Fundus Photography - OU - Both Eyes      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 Infections, discomfort form sutures and hemorrhage as well as retinal detachment were explained. The biggest risk of  surgery failing in a retinal detachment are due to challenges in surgery or late scar formation described as proliferative vitreoretinopathy. In a membrane peel there can be  along period for recovery of visual acuity, in more long standing membrane,s takes longer to resolve retinal abnormalities

## 2025-08-13 ENCOUNTER — ANESTHESIA EVENT (OUTPATIENT)
Dept: OPERATING ROOM | Facility: CLINIC | Age: 85
End: 2025-08-13
Payer: COMMERCIAL

## 2025-08-14 ENCOUNTER — HOSPITAL ENCOUNTER (OUTPATIENT)
Facility: CLINIC | Age: 85
Setting detail: OUTPATIENT SURGERY
Discharge: HOME | End: 2025-08-14
Attending: OPHTHALMOLOGY | Admitting: OPHTHALMOLOGY
Payer: COMMERCIAL

## 2025-08-14 ENCOUNTER — ANESTHESIA (OUTPATIENT)
Dept: OPERATING ROOM | Facility: CLINIC | Age: 85
End: 2025-08-14
Payer: COMMERCIAL

## 2025-08-14 VITALS
WEIGHT: 162.92 LBS | SYSTOLIC BLOOD PRESSURE: 181 MMHG | BODY MASS INDEX: 26.18 KG/M2 | OXYGEN SATURATION: 98 % | DIASTOLIC BLOOD PRESSURE: 80 MMHG | RESPIRATION RATE: 16 BRPM | HEART RATE: 81 BPM | HEIGHT: 66 IN | TEMPERATURE: 96.8 F

## 2025-08-14 DIAGNOSIS — H43.11 VITREOUS HEMORRHAGE, RIGHT EYE (MULTI): Primary | ICD-10-CM

## 2025-08-14 PROCEDURE — 3700000001 HC GENERAL ANESTHESIA TIME - INITIAL BASE CHARGE: Performed by: OPHTHALMOLOGY

## 2025-08-14 PROCEDURE — 7100000010 HC PHASE TWO TIME - EACH INCREMENTAL 1 MINUTE: Performed by: OPHTHALMOLOGY

## 2025-08-14 PROCEDURE — 2500000004 HC RX 250 GENERAL PHARMACY W/ HCPCS (ALT 636 FOR OP/ED): Performed by: ANESTHESIOLOGIST ASSISTANT

## 2025-08-14 PROCEDURE — 2500000005 HC RX 250 GENERAL PHARMACY W/O HCPCS: Performed by: OPHTHALMOLOGY

## 2025-08-14 PROCEDURE — 3600000003 HC OR TIME - INITIAL BASE CHARGE - PROCEDURE LEVEL THREE: Performed by: OPHTHALMOLOGY

## 2025-08-14 PROCEDURE — 3700000002 HC GENERAL ANESTHESIA TIME - EACH INCREMENTAL 1 MINUTE: Performed by: OPHTHALMOLOGY

## 2025-08-14 PROCEDURE — 7100000009 HC PHASE TWO TIME - INITIAL BASE CHARGE: Performed by: OPHTHALMOLOGY

## 2025-08-14 PROCEDURE — 3600000008 HC OR TIME - EACH INCREMENTAL 1 MINUTE - PROCEDURE LEVEL THREE: Performed by: OPHTHALMOLOGY

## 2025-08-14 PROCEDURE — 67040 LASER TREATMENT OF RETINA: CPT | Performed by: OPHTHALMOLOGY

## 2025-08-14 PROCEDURE — 2720000007 HC OR 272 NO HCPCS: Performed by: OPHTHALMOLOGY

## 2025-08-14 PROCEDURE — 2500000004 HC RX 250 GENERAL PHARMACY W/ HCPCS (ALT 636 FOR OP/ED): Performed by: OPHTHALMOLOGY

## 2025-08-14 RX ORDER — PROPARACAINE HYDROCHLORIDE 5 MG/ML
1 SOLUTION/ DROPS OPHTHALMIC ONCE
Status: COMPLETED | OUTPATIENT
Start: 2025-08-14 | End: 2025-08-14

## 2025-08-14 RX ORDER — PREDNISOLONE ACETATE 10 MG/ML
1 SUSPENSION/ DROPS OPHTHALMIC 4 TIMES DAILY
Qty: 5 ML | Refills: 0 | Status: SHIPPED | OUTPATIENT
Start: 2025-08-14

## 2025-08-14 RX ORDER — SODIUM CHLORIDE, SODIUM LACTATE, POTASSIUM CHLORIDE, CALCIUM CHLORIDE 600; 310; 30; 20 MG/100ML; MG/100ML; MG/100ML; MG/100ML
INJECTION, SOLUTION INTRAVENOUS CONTINUOUS PRN
Status: DISCONTINUED | OUTPATIENT
Start: 2025-08-14 | End: 2025-08-14

## 2025-08-14 RX ORDER — FENTANYL CITRATE 50 UG/ML
INJECTION, SOLUTION INTRAMUSCULAR; INTRAVENOUS AS NEEDED
Status: DISCONTINUED | OUTPATIENT
Start: 2025-08-14 | End: 2025-08-14

## 2025-08-14 RX ORDER — TETRACAINE HYDROCHLORIDE 5 MG/ML
1 SOLUTION OPHTHALMIC ONCE
Status: DISCONTINUED | OUTPATIENT
Start: 2025-08-14 | End: 2025-08-14 | Stop reason: HOSPADM

## 2025-08-14 RX ORDER — OFLOXACIN 3 MG/ML
1 SOLUTION/ DROPS OPHTHALMIC 4 TIMES DAILY
Qty: 5 ML | Refills: 0 | Status: SHIPPED | OUTPATIENT
Start: 2025-08-14

## 2025-08-14 RX ORDER — PROPOFOL 10 MG/ML
INJECTION, EMULSION INTRAVENOUS AS NEEDED
Status: DISCONTINUED | OUTPATIENT
Start: 2025-08-14 | End: 2025-08-14

## 2025-08-14 RX ORDER — PHENYLEPHRINE HYDROCHLORIDE 25 MG/ML
1 SOLUTION/ DROPS OPHTHALMIC
Status: COMPLETED | OUTPATIENT
Start: 2025-08-14 | End: 2025-08-14

## 2025-08-14 RX ORDER — OXYCODONE HYDROCHLORIDE 5 MG/1
5 TABLET ORAL EVERY 4 HOURS PRN
Status: DISCONTINUED | OUTPATIENT
Start: 2025-08-14 | End: 2025-08-14 | Stop reason: HOSPADM

## 2025-08-14 RX ORDER — OXYCODONE HYDROCHLORIDE 5 MG/1
10 TABLET ORAL EVERY 4 HOURS PRN
Status: DISCONTINUED | OUTPATIENT
Start: 2025-08-14 | End: 2025-08-14 | Stop reason: HOSPADM

## 2025-08-14 RX ORDER — WATER 1 ML/ML
INJECTION IRRIGATION AS NEEDED
Status: DISCONTINUED | OUTPATIENT
Start: 2025-08-14 | End: 2025-08-14 | Stop reason: HOSPADM

## 2025-08-14 RX ORDER — POVIDONE-IODINE 5 %
SOLUTION, NON-ORAL OPHTHALMIC (EYE) AS NEEDED
Status: DISCONTINUED | OUTPATIENT
Start: 2025-08-14 | End: 2025-08-14 | Stop reason: HOSPADM

## 2025-08-14 RX ORDER — ACETAMINOPHEN 325 MG/1
650 TABLET ORAL ONCE
Status: DISCONTINUED | OUTPATIENT
Start: 2025-08-14 | End: 2025-08-14 | Stop reason: HOSPADM

## 2025-08-14 RX ORDER — TROPICAMIDE 10 MG/ML
1 SOLUTION/ DROPS OPHTHALMIC
Status: COMPLETED | OUTPATIENT
Start: 2025-08-14 | End: 2025-08-14

## 2025-08-14 RX ADMIN — PROPARACAINE HYDROCHLORIDE 1 DROP: 5 SOLUTION/ DROPS OPHTHALMIC at 11:55

## 2025-08-14 RX ADMIN — PHENYLEPHRINE HYDROCHLORIDE 1 DROP: 25 SOLUTION/ DROPS OPHTHALMIC at 11:55

## 2025-08-14 RX ADMIN — PHENYLEPHRINE HYDROCHLORIDE 1 DROP: 25 SOLUTION/ DROPS OPHTHALMIC at 12:00

## 2025-08-14 RX ADMIN — TROPICAMIDE 1 DROP: 10 SOLUTION/ DROPS OPHTHALMIC at 12:05

## 2025-08-14 RX ADMIN — TROPICAMIDE 1 DROP: 10 SOLUTION/ DROPS OPHTHALMIC at 11:55

## 2025-08-14 RX ADMIN — TROPICAMIDE 1 DROP: 10 SOLUTION/ DROPS OPHTHALMIC at 12:00

## 2025-08-14 RX ADMIN — FENTANYL CITRATE 25 MCG: 50 INJECTION, SOLUTION INTRAMUSCULAR; INTRAVENOUS at 12:20

## 2025-08-14 RX ADMIN — PROPOFOL 20 MG: 10 INJECTION, EMULSION INTRAVENOUS at 12:20

## 2025-08-14 RX ADMIN — SODIUM CHLORIDE, POTASSIUM CHLORIDE, SODIUM LACTATE AND CALCIUM CHLORIDE: 600; 310; 30; 20 INJECTION, SOLUTION INTRAVENOUS at 12:18

## 2025-08-14 RX ADMIN — PHENYLEPHRINE HYDROCHLORIDE 1 DROP: 25 SOLUTION/ DROPS OPHTHALMIC at 12:05

## 2025-08-14 RX ADMIN — PROPOFOL 20 MG: 10 INJECTION, EMULSION INTRAVENOUS at 12:26

## 2025-08-14 ASSESSMENT — PAIN - FUNCTIONAL ASSESSMENT
PAIN_FUNCTIONAL_ASSESSMENT: 0-10

## 2025-08-14 ASSESSMENT — PAIN SCALES - GENERAL
PAINLEVEL_OUTOF10: 0 - NO PAIN

## 2025-08-15 ENCOUNTER — APPOINTMENT (OUTPATIENT)
Dept: OPHTHALMOLOGY | Facility: CLINIC | Age: 85
End: 2025-08-15
Payer: COMMERCIAL

## 2025-08-15 DIAGNOSIS — H43.12 VITREOUS HEMORRHAGE OF LEFT EYE (MULTI): ICD-10-CM

## 2025-08-15 DIAGNOSIS — H34.8320 BRANCH RETINAL VEIN OCCLUSION OF LEFT EYE WITH MACULAR EDEMA: ICD-10-CM

## 2025-08-15 DIAGNOSIS — H43.11 VITREOUS HEMORRHAGE, RIGHT EYE (MULTI): ICD-10-CM

## 2025-08-15 DIAGNOSIS — E16.2 HYPOGLYCEMIA, UNSPECIFIED: Primary | ICD-10-CM

## 2025-08-15 PROCEDURE — 99024 POSTOP FOLLOW-UP VISIT: CPT | Performed by: OPHTHALMOLOGY

## 2025-08-15 ASSESSMENT — ENCOUNTER SYMPTOMS
RESPIRATORY NEGATIVE: 0
CONSTITUTIONAL NEGATIVE: 0
GASTROINTESTINAL NEGATIVE: 0
ALLERGIC/IMMUNOLOGIC NEGATIVE: 0
CARDIOVASCULAR NEGATIVE: 0
NEUROLOGICAL NEGATIVE: 0
EYES NEGATIVE: 1
MUSCULOSKELETAL NEGATIVE: 0
PSYCHIATRIC NEGATIVE: 0
HEMATOLOGIC/LYMPHATIC NEGATIVE: 0
ENDOCRINE NEGATIVE: 0

## 2025-08-15 ASSESSMENT — EXTERNAL EXAM - RIGHT EYE: OD_EXAM: NORMAL

## 2025-08-15 ASSESSMENT — SLIT LAMP EXAM - LIDS
COMMENTS: GOOD POSITION
COMMENTS: GOOD POSITION

## 2025-08-15 ASSESSMENT — CUP TO DISC RATIO
OS_RATIO: .9
OD_RATIO: 0.8

## 2025-08-15 ASSESSMENT — PACHYMETRY
OD_CT(UM): 584
OS_CT(UM): 557

## 2025-08-15 ASSESSMENT — VISUAL ACUITY: METHOD: SNELLEN - LINEAR

## 2025-08-15 ASSESSMENT — TONOMETRY
OD_IOP_MMHG: 10
IOP_METHOD: GOLDMANN APPLANATION

## 2025-08-15 ASSESSMENT — EXTERNAL EXAM - LEFT EYE: OS_EXAM: NORMAL

## 2025-08-28 ENCOUNTER — APPOINTMENT (OUTPATIENT)
Dept: PRIMARY CARE | Facility: CLINIC | Age: 85
End: 2025-08-28
Payer: COMMERCIAL

## 2025-09-05 ENCOUNTER — APPOINTMENT (OUTPATIENT)
Dept: OPHTHALMOLOGY | Facility: CLINIC | Age: 85
End: 2025-09-05
Payer: COMMERCIAL

## 2025-09-10 ENCOUNTER — APPOINTMENT (OUTPATIENT)
Dept: PRIMARY CARE | Facility: CLINIC | Age: 85
End: 2025-09-10
Payer: COMMERCIAL

## 2025-09-17 ENCOUNTER — APPOINTMENT (OUTPATIENT)
Dept: OPHTHALMOLOGY | Facility: CLINIC | Age: 85
End: 2025-09-17
Payer: COMMERCIAL

## (undated) DEVICE — BLADE, OPHTHALMIC, CRESCENT, POCKET, ANG 55 DEG, 2.5 MM, MATTE

## (undated) DEVICE — PROBE, 25G ILLUMINATED FLEX CURVED LASER 2X W/RFID

## (undated) DEVICE — Device

## (undated) DEVICE — DRAPE, SURGICAL, STERI DRAPE, INCISE, W/POUCH, MED, LF

## (undated) DEVICE — SPEAR, EYE, SURGICAL, WECK-CEL, CELLULOSE

## (undated) DEVICE — GLOVE, SURGICAL, PROTEXIS PI , 7.5, PF, LF

## (undated) DEVICE — SUTURE, VICRYL, 7-0, 18 IN, TG1608, DA, VIOLET

## (undated) DEVICE — BLADE, OPHTHALMIC, SCLEROTOME, MULTI-SIDED, SHARP ALL AROUND

## (undated) DEVICE — SYRINGE, 1 CC, LUER LOCK

## (undated) DEVICE — NEEDLE, RETROBULBAR, 23G X 8MM

## (undated) DEVICE — GLOVE, SURGICAL, PROTEXIS PI , 7.0, PF, LF

## (undated) DEVICE — NEEDLE, HYPODERMIC, REGULAR WALL, REGULAR BEVEL, 18 G X 1.5 IN

## (undated) DEVICE — SYRINGE, 10 CC, LUER LOCK

## (undated) DEVICE — BIOM, OPTIC, HD PROFESSIONAL F/F=200MM

## (undated) DEVICE — SUTURE, SILK, 2-0, 18IN, BR, FS, BLACK

## (undated) DEVICE — 25+GA HYPERVIT BEVEL 20K CPM WIDE TOTAL PLUS VITRECTOMY PAK

## (undated) DEVICE — NEEDLE, HYPODERMIC, REGULAR WALL, REGULAR BEVEL, 30 G X 0.5 IN

## (undated) DEVICE — TOWEL, SURGICAL, NEURO, O/R, 16 X 26, BLUE, STERILE

## (undated) DEVICE — LABELS, OR GENERAL, W/MARKER

## (undated) DEVICE — MARKER, SKIN, RULER AND LABEL PACK, CUSTOM

## (undated) DEVICE — COVER HANDLE LIGHT, STERIS, BLUE, STERILE